# Patient Record
Sex: MALE | Race: BLACK OR AFRICAN AMERICAN | Employment: FULL TIME
[De-identification: names, ages, dates, MRNs, and addresses within clinical notes are randomized per-mention and may not be internally consistent; named-entity substitution may affect disease eponyms.]

---

## 2017-02-03 ENCOUNTER — TELEPHONE (OUTPATIENT)
Dept: ONCOLOGY | Facility: CLINIC | Age: 57
End: 2017-02-03

## 2017-04-17 ENCOUNTER — HOSPITAL ENCOUNTER (OUTPATIENT)
Age: 57
Discharge: HOME | End: 2017-04-17
Payer: COMMERCIAL

## 2017-04-17 LAB — PROSTATE SPECIFIC ANTIGEN: 1.59 UG/L

## 2017-04-17 PROCEDURE — 84153 ASSAY OF PSA TOTAL: CPT

## 2017-04-17 PROCEDURE — 36415 COLL VENOUS BLD VENIPUNCTURE: CPT

## 2017-04-19 ENCOUNTER — HOSPITAL ENCOUNTER (OUTPATIENT)
Dept: PHARMACY | Age: 57
Setting detail: THERAPIES SERIES
Discharge: HOME | End: 2017-04-19
Payer: COMMERCIAL

## 2017-04-19 DIAGNOSIS — I26.99 PULMONARY EMBOLISM, OTHER: ICD-10-CM

## 2017-04-19 DIAGNOSIS — I82.491 DEEP VEIN THROMBOSIS (DVT) OF OTHER VEIN OF RIGHT LOWER EXTREMITY: ICD-10-CM

## 2017-04-19 LAB
INR BLD: 2.4
PROTIME: 29.3 SECONDS

## 2017-04-19 PROCEDURE — 85610 PROTHROMBIN TIME: CPT

## 2017-04-19 PROCEDURE — G0463 HOSPITAL OUTPT CLINIC VISIT: HCPCS

## 2017-05-31 ENCOUNTER — TELEPHONE (OUTPATIENT)
Dept: PHARMACY | Age: 57
End: 2017-05-31

## 2017-05-31 DIAGNOSIS — I82.491 DEEP VEIN THROMBOSIS (DVT) OF OTHER VEIN OF RIGHT LOWER EXTREMITY: ICD-10-CM

## 2017-05-31 DIAGNOSIS — I26.99 PULMONARY EMBOLISM, OTHER: ICD-10-CM

## 2017-06-05 ENCOUNTER — HOSPITAL ENCOUNTER (OUTPATIENT)
Dept: PHARMACY | Age: 57
Setting detail: THERAPIES SERIES
Discharge: HOME | End: 2017-06-05
Payer: COMMERCIAL

## 2017-06-05 DIAGNOSIS — I82.491 DEEP VEIN THROMBOSIS (DVT) OF OTHER VEIN OF RIGHT LOWER EXTREMITY: ICD-10-CM

## 2017-06-05 DIAGNOSIS — I26.99 PULMONARY EMBOLISM, OTHER: ICD-10-CM

## 2017-06-05 LAB
INR BLD: 3.1
PROTIME: 37 SECONDS

## 2017-06-05 PROCEDURE — 85610 PROTHROMBIN TIME: CPT

## 2017-06-05 PROCEDURE — 99211 OFF/OP EST MAY X REQ PHY/QHP: CPT

## 2017-07-17 ENCOUNTER — APPOINTMENT (OUTPATIENT)
Dept: PHARMACY | Age: 57
End: 2017-07-17
Payer: COMMERCIAL

## 2017-07-17 ENCOUNTER — HOSPITAL ENCOUNTER (OUTPATIENT)
Dept: PHARMACY | Age: 57
Setting detail: THERAPIES SERIES
Discharge: HOME | End: 2017-07-17
Payer: COMMERCIAL

## 2017-07-17 LAB
INR BLD: 3.5
PROTIME: 41.6 SECONDS

## 2017-07-17 PROCEDURE — 85610 PROTHROMBIN TIME: CPT

## 2017-07-17 PROCEDURE — 99211 OFF/OP EST MAY X REQ PHY/QHP: CPT

## 2017-07-31 ENCOUNTER — HOSPITAL ENCOUNTER (OUTPATIENT)
Dept: PHARMACY | Age: 57
Setting detail: THERAPIES SERIES
Discharge: HOME | End: 2017-07-31
Payer: COMMERCIAL

## 2017-07-31 LAB
INR BLD: 2.9
PROTIME: 34.2 SECONDS

## 2017-07-31 PROCEDURE — 85610 PROTHROMBIN TIME: CPT

## 2017-07-31 PROCEDURE — 99211 OFF/OP EST MAY X REQ PHY/QHP: CPT

## 2017-09-11 ENCOUNTER — TELEPHONE (OUTPATIENT)
Dept: PHARMACY | Age: 57
End: 2017-09-11

## 2017-09-11 ENCOUNTER — APPOINTMENT (OUTPATIENT)
Dept: PHARMACY | Age: 57
End: 2017-09-11
Payer: COMMERCIAL

## 2017-09-26 ENCOUNTER — HOSPITAL ENCOUNTER (OUTPATIENT)
Dept: PHARMACY | Age: 57
Setting detail: THERAPIES SERIES
Discharge: HOME | End: 2017-09-26
Payer: COMMERCIAL

## 2017-09-26 LAB
INR BLD: 2.9
PROTIME: 34.8 SECONDS

## 2017-09-26 PROCEDURE — 85610 PROTHROMBIN TIME: CPT

## 2017-09-26 PROCEDURE — 99211 OFF/OP EST MAY X REQ PHY/QHP: CPT

## 2017-11-07 ENCOUNTER — APPOINTMENT (OUTPATIENT)
Dept: PHARMACY | Age: 57
End: 2017-11-07
Payer: COMMERCIAL

## 2017-11-07 ENCOUNTER — TELEPHONE (OUTPATIENT)
Dept: PHARMACY | Age: 57
End: 2017-11-07

## 2017-11-07 DIAGNOSIS — I26.99 OTHER PULMONARY EMBOLISM WITHOUT ACUTE COR PULMONALE, UNSPECIFIED CHRONICITY (HCC): ICD-10-CM

## 2017-11-07 DIAGNOSIS — I82.401 DEEP VEIN THROMBOSIS (DVT) OF RIGHT LOWER EXTREMITY, UNSPECIFIED CHRONICITY, UNSPECIFIED VEIN (HCC): ICD-10-CM

## 2017-11-22 ENCOUNTER — HOSPITAL ENCOUNTER (OUTPATIENT)
Dept: PHARMACY | Age: 57
Setting detail: THERAPIES SERIES
Discharge: HOME | End: 2017-11-22
Payer: COMMERCIAL

## 2017-11-22 DIAGNOSIS — I26.99 OTHER PULMONARY EMBOLISM WITHOUT ACUTE COR PULMONALE, UNSPECIFIED CHRONICITY (HCC): ICD-10-CM

## 2017-11-22 DIAGNOSIS — I82.401 DEEP VEIN THROMBOSIS (DVT) OF RIGHT LOWER EXTREMITY, UNSPECIFIED CHRONICITY, UNSPECIFIED VEIN (HCC): ICD-10-CM

## 2017-11-22 LAB
INR BLD: 2.8
PROTIME: 33.6 SECONDS

## 2017-11-22 PROCEDURE — 85610 PROTHROMBIN TIME: CPT

## 2017-11-22 PROCEDURE — 99211 OFF/OP EST MAY X REQ PHY/QHP: CPT

## 2017-12-20 ENCOUNTER — TELEPHONE (OUTPATIENT)
Dept: PHARMACY | Age: 57
End: 2017-12-20

## 2017-12-20 ENCOUNTER — APPOINTMENT (OUTPATIENT)
Dept: PHARMACY | Age: 57
End: 2017-12-20
Payer: COMMERCIAL

## 2017-12-20 DIAGNOSIS — I82.401 DEEP VEIN THROMBOSIS (DVT) OF RIGHT LOWER EXTREMITY, UNSPECIFIED CHRONICITY, UNSPECIFIED VEIN (HCC): ICD-10-CM

## 2017-12-20 DIAGNOSIS — I26.99 OTHER PULMONARY EMBOLISM WITHOUT ACUTE COR PULMONALE, UNSPECIFIED CHRONICITY (HCC): ICD-10-CM

## 2017-12-27 ENCOUNTER — HOSPITAL ENCOUNTER (OUTPATIENT)
Dept: PHARMACY | Age: 57
Setting detail: THERAPIES SERIES
Discharge: HOME | End: 2017-12-27
Payer: COMMERCIAL

## 2017-12-27 DIAGNOSIS — I82.401 DEEP VEIN THROMBOSIS (DVT) OF RIGHT LOWER EXTREMITY, UNSPECIFIED CHRONICITY, UNSPECIFIED VEIN (HCC): ICD-10-CM

## 2017-12-27 DIAGNOSIS — I26.99 OTHER PULMONARY EMBOLISM WITHOUT ACUTE COR PULMONALE, UNSPECIFIED CHRONICITY (HCC): ICD-10-CM

## 2017-12-27 LAB
INR BLD: 2.3
PROTIME: 27.9 SECONDS

## 2017-12-27 PROCEDURE — 85610 PROTHROMBIN TIME: CPT

## 2017-12-27 PROCEDURE — 99211 OFF/OP EST MAY X REQ PHY/QHP: CPT

## 2017-12-27 NOTE — PROGRESS NOTES
Patient states compliant all of the time with regimen. No bleeding or thromboembolic side effects noted. No significant med or dietary changes. No significant recent illness or disease state changes. PT/INR done in office per protocol. INR is 2.3 which is therapeutic. Warfarin regimen will be continued at current dose of 20mg on Mon and 15mg all other days. Will retest in 5 weeks. Patient understands dosing directions and information discussed. Dosing schedule and follow up appointment given to patient. Progress note routed to referring physicians office. Patient acknowledges working in consult agreement with pharmacist as referred by his/her physician.

## 2018-01-19 ENCOUNTER — HOSPITAL ENCOUNTER (OUTPATIENT)
Dept: PHARMACY | Age: 58
Setting detail: THERAPIES SERIES
Discharge: HOME | End: 2018-01-19
Payer: COMMERCIAL

## 2018-01-19 DIAGNOSIS — I26.99 OTHER PULMONARY EMBOLISM WITHOUT ACUTE COR PULMONALE, UNSPECIFIED CHRONICITY (HCC): ICD-10-CM

## 2018-01-19 DIAGNOSIS — I82.401 DEEP VEIN THROMBOSIS (DVT) OF RIGHT LOWER EXTREMITY, UNSPECIFIED CHRONICITY, UNSPECIFIED VEIN (HCC): ICD-10-CM

## 2018-01-19 LAB
INR BLD: 1.3
PROTIME: 16.2 SECONDS

## 2018-01-19 PROCEDURE — 99211 OFF/OP EST MAY X REQ PHY/QHP: CPT

## 2018-01-19 PROCEDURE — 85610 PROTHROMBIN TIME: CPT

## 2018-01-19 NOTE — PROGRESS NOTES
Patient states compliant all of the time with regimen. No bleeding or thromboembolic side effects noted. No significant dietary changes. Patient reports starting Lovenox on 1/17 and will be on it for 10 days, since he will be having a colonoscopy on 1/22, made sure patient knew to hold Lovenox as well the day of the procedure. No significant recent illness or disease state changes. PT/INR done in office per protocol. INR is 1.3 which is subtherapeutic, due to holding dose for procedure. Warfarin regimen was held starting on 1/17 until 1/22 for procedure. Will restart following procedure 20 mg for next Tues, Wed and Thurs along with Lovenox injections. Will retest in 1 week. Patient understands dosing directions and information discussed. Dosing schedule and follow up appointment given to patient. Progress note routed to referring physicians office. Patient acknowledges working in consult agreement with pharmacist as referred by his/her physician.

## 2018-01-26 ENCOUNTER — HOSPITAL ENCOUNTER (OUTPATIENT)
Dept: PHARMACY | Age: 58
Setting detail: THERAPIES SERIES
Discharge: HOME | End: 2018-01-26
Payer: COMMERCIAL

## 2018-01-26 DIAGNOSIS — I26.99 OTHER PULMONARY EMBOLISM WITHOUT ACUTE COR PULMONALE, UNSPECIFIED CHRONICITY (HCC): ICD-10-CM

## 2018-01-26 DIAGNOSIS — I82.401 DEEP VEIN THROMBOSIS (DVT) OF RIGHT LOWER EXTREMITY, UNSPECIFIED CHRONICITY, UNSPECIFIED VEIN (HCC): ICD-10-CM

## 2018-01-26 LAB
INR BLD: 1.5
PROTIME: 17.6 SECONDS

## 2018-01-26 PROCEDURE — 85610 PROTHROMBIN TIME: CPT

## 2018-01-26 PROCEDURE — 99211 OFF/OP EST MAY X REQ PHY/QHP: CPT

## 2018-01-29 ENCOUNTER — HOSPITAL ENCOUNTER (OUTPATIENT)
Dept: PHARMACY | Age: 58
Setting detail: THERAPIES SERIES
Discharge: HOME | End: 2018-01-29
Payer: COMMERCIAL

## 2018-01-29 DIAGNOSIS — I82.401 DEEP VEIN THROMBOSIS (DVT) OF RIGHT LOWER EXTREMITY, UNSPECIFIED CHRONICITY, UNSPECIFIED VEIN (HCC): ICD-10-CM

## 2018-01-29 DIAGNOSIS — I26.99 OTHER PULMONARY EMBOLISM WITHOUT ACUTE COR PULMONALE, UNSPECIFIED CHRONICITY (HCC): ICD-10-CM

## 2018-01-29 LAB
INR BLD: 2.2
PROTIME: 26.7 SECONDS

## 2018-01-29 PROCEDURE — 99211 OFF/OP EST MAY X REQ PHY/QHP: CPT

## 2018-01-29 PROCEDURE — 85610 PROTHROMBIN TIME: CPT

## 2018-01-31 ENCOUNTER — APPOINTMENT (OUTPATIENT)
Dept: PHARMACY | Age: 58
End: 2018-01-31
Payer: COMMERCIAL

## 2018-02-26 ENCOUNTER — TELEPHONE (OUTPATIENT)
Dept: PHARMACY | Age: 58
End: 2018-02-26

## 2018-02-26 DIAGNOSIS — I82.401 DEEP VEIN THROMBOSIS (DVT) OF RIGHT LOWER EXTREMITY, UNSPECIFIED CHRONICITY, UNSPECIFIED VEIN (HCC): ICD-10-CM

## 2018-02-26 DIAGNOSIS — I26.99 OTHER PULMONARY EMBOLISM WITHOUT ACUTE COR PULMONALE, UNSPECIFIED CHRONICITY (HCC): ICD-10-CM

## 2018-02-26 NOTE — TELEPHONE ENCOUNTER
Patient LVM to cancel appt this morning as he was moved to a different school. He will call back to reschedule.

## 2018-03-20 ENCOUNTER — HOSPITAL ENCOUNTER (OUTPATIENT)
Dept: PHARMACY | Age: 58
Setting detail: THERAPIES SERIES
Discharge: HOME | End: 2018-03-20
Payer: COMMERCIAL

## 2018-03-20 DIAGNOSIS — I26.99 OTHER PULMONARY EMBOLISM WITHOUT ACUTE COR PULMONALE, UNSPECIFIED CHRONICITY (HCC): ICD-10-CM

## 2018-03-20 DIAGNOSIS — I82.401 DEEP VEIN THROMBOSIS (DVT) OF RIGHT LOWER EXTREMITY, UNSPECIFIED CHRONICITY, UNSPECIFIED VEIN (HCC): ICD-10-CM

## 2018-03-20 LAB
INR BLD: 3.6
PROTIME: 43.08 SECONDS

## 2018-03-20 PROCEDURE — 99211 OFF/OP EST MAY X REQ PHY/QHP: CPT

## 2018-03-20 PROCEDURE — 85610 PROTHROMBIN TIME: CPT

## 2018-04-03 ENCOUNTER — HOSPITAL ENCOUNTER (OUTPATIENT)
Dept: PHARMACY | Age: 58
Setting detail: THERAPIES SERIES
Discharge: HOME | End: 2018-04-03
Payer: COMMERCIAL

## 2018-04-03 DIAGNOSIS — I82.401 DEEP VEIN THROMBOSIS (DVT) OF RIGHT LOWER EXTREMITY, UNSPECIFIED CHRONICITY, UNSPECIFIED VEIN (HCC): ICD-10-CM

## 2018-04-03 DIAGNOSIS — I26.99 OTHER PULMONARY EMBOLISM WITHOUT ACUTE COR PULMONALE, UNSPECIFIED CHRONICITY (HCC): ICD-10-CM

## 2018-04-03 LAB
INR BLD: 3
PROTIME: 35.6 SECONDS

## 2018-04-03 PROCEDURE — 85610 PROTHROMBIN TIME: CPT

## 2018-04-03 PROCEDURE — 99211 OFF/OP EST MAY X REQ PHY/QHP: CPT

## 2018-05-01 ENCOUNTER — HOSPITAL ENCOUNTER (OUTPATIENT)
Dept: PHARMACY | Age: 58
Setting detail: THERAPIES SERIES
Discharge: HOME | End: 2018-05-01
Payer: COMMERCIAL

## 2018-05-01 DIAGNOSIS — I82.401 DEEP VEIN THROMBOSIS (DVT) OF RIGHT LOWER EXTREMITY, UNSPECIFIED CHRONICITY, UNSPECIFIED VEIN (HCC): ICD-10-CM

## 2018-05-01 DIAGNOSIS — I26.99 OTHER PULMONARY EMBOLISM WITHOUT ACUTE COR PULMONALE, UNSPECIFIED CHRONICITY (HCC): ICD-10-CM

## 2018-05-01 LAB
INR BLD: 2.7
PROTIME: 31.9 SECONDS

## 2018-05-01 PROCEDURE — 99211 OFF/OP EST MAY X REQ PHY/QHP: CPT

## 2018-05-01 PROCEDURE — 85610 PROTHROMBIN TIME: CPT

## 2018-05-29 ENCOUNTER — HOSPITAL ENCOUNTER (OUTPATIENT)
Dept: PHARMACY | Age: 58
Setting detail: THERAPIES SERIES
Discharge: HOME | End: 2018-05-29
Payer: COMMERCIAL

## 2018-05-29 DIAGNOSIS — I82.401 DEEP VEIN THROMBOSIS (DVT) OF RIGHT LOWER EXTREMITY, UNSPECIFIED CHRONICITY, UNSPECIFIED VEIN (HCC): ICD-10-CM

## 2018-05-29 DIAGNOSIS — I26.99 OTHER PULMONARY EMBOLISM WITHOUT ACUTE COR PULMONALE, UNSPECIFIED CHRONICITY (HCC): ICD-10-CM

## 2018-05-29 LAB
INR BLD: 2.6
PROTIME: 31.3 SECONDS

## 2018-05-29 PROCEDURE — 85610 PROTHROMBIN TIME: CPT

## 2018-05-29 PROCEDURE — 99211 OFF/OP EST MAY X REQ PHY/QHP: CPT

## 2018-06-26 ENCOUNTER — APPOINTMENT (OUTPATIENT)
Dept: PHARMACY | Age: 58
End: 2018-06-26
Payer: COMMERCIAL

## 2018-06-28 ENCOUNTER — HOSPITAL ENCOUNTER (OUTPATIENT)
Age: 58
Discharge: HOME | End: 2018-06-28
Payer: COMMERCIAL

## 2018-06-28 LAB
ANION GAP SERPL CALCULATED.3IONS-SCNC: 10 MMOL/L (ref 9–17)
BILIRUBIN URINE: NEGATIVE
BUN BLDV-MCNC: 15 MG/DL (ref 6–20)
BUN/CREAT BLD: 18 (ref 9–20)
CALCIUM SERPL-MCNC: 9.2 MG/DL (ref 8.6–10.4)
CHLORIDE BLD-SCNC: 100 MMOL/L (ref 98–107)
CHOLESTEROL, FASTING: 182 MG/DL
CHOLESTEROL/HDL RATIO: 3.9
CO2: 27 MMOL/L (ref 20–31)
COLOR: YELLOW
COMMENT UA: ABNORMAL
CREAT SERPL-MCNC: 0.84 MG/DL (ref 0.7–1.2)
CREATININE URINE: 84.1 MG/DL (ref 39–259)
GFR AFRICAN AMERICAN: >60 ML/MIN
GFR NON-AFRICAN AMERICAN: >60 ML/MIN
GFR SERPL CREATININE-BSD FRML MDRD: NORMAL ML/MIN/{1.73_M2}
GFR SERPL CREATININE-BSD FRML MDRD: NORMAL ML/MIN/{1.73_M2}
GLUCOSE BLD-MCNC: 89 MG/DL (ref 70–99)
GLUCOSE URINE: NEGATIVE
HDLC SERPL-MCNC: 47 MG/DL
KETONES, URINE: NEGATIVE
LDL CHOLESTEROL: 104 MG/DL (ref 0–130)
LEUKOCYTE ESTERASE, URINE: NEGATIVE
NITRITE, URINE: NEGATIVE
PH UA: 7 (ref 5–8)
POTASSIUM SERPL-SCNC: 4.3 MMOL/L (ref 3.7–5.3)
PROSTATE SPECIFIC ANTIGEN: 1.91 UG/L
PROTEIN UA: NEGATIVE
SODIUM BLD-SCNC: 137 MMOL/L (ref 135–144)
SPECIFIC GRAVITY UA: 1 (ref 1–1.03)
TOTAL PROTEIN, URINE: 10 MG/DL
TRIGLYCERIDE, FASTING: 153 MG/DL
TURBIDITY: CLEAR
URINE HGB: NEGATIVE
UROBILINOGEN, URINE: NORMAL
VLDLC SERPL CALC-MCNC: ABNORMAL MG/DL (ref 1–30)

## 2018-06-28 PROCEDURE — 36415 COLL VENOUS BLD VENIPUNCTURE: CPT

## 2018-06-28 PROCEDURE — 80048 BASIC METABOLIC PNL TOTAL CA: CPT

## 2018-06-28 PROCEDURE — 84156 ASSAY OF PROTEIN URINE: CPT

## 2018-06-28 PROCEDURE — 82570 ASSAY OF URINE CREATININE: CPT

## 2018-06-28 PROCEDURE — 84153 ASSAY OF PSA TOTAL: CPT

## 2018-06-28 PROCEDURE — 80061 LIPID PANEL: CPT

## 2018-06-28 PROCEDURE — 81003 URINALYSIS AUTO W/O SCOPE: CPT

## 2018-07-03 ENCOUNTER — HOSPITAL ENCOUNTER (OUTPATIENT)
Dept: PHARMACY | Age: 58
Setting detail: THERAPIES SERIES
Discharge: HOME | End: 2018-07-03
Payer: COMMERCIAL

## 2018-07-03 DIAGNOSIS — I82.401 DEEP VEIN THROMBOSIS (DVT) OF RIGHT LOWER EXTREMITY, UNSPECIFIED CHRONICITY, UNSPECIFIED VEIN (HCC): ICD-10-CM

## 2018-07-03 DIAGNOSIS — I26.99 OTHER PULMONARY EMBOLISM WITHOUT ACUTE COR PULMONALE, UNSPECIFIED CHRONICITY (HCC): ICD-10-CM

## 2018-07-03 LAB
INR BLD: 2.6
PROTIME: 31.4 SECONDS

## 2018-07-03 PROCEDURE — 85610 PROTHROMBIN TIME: CPT

## 2018-07-03 PROCEDURE — 99211 OFF/OP EST MAY X REQ PHY/QHP: CPT

## 2018-08-14 ENCOUNTER — HOSPITAL ENCOUNTER (OUTPATIENT)
Dept: PHARMACY | Age: 58
Setting detail: THERAPIES SERIES
Discharge: HOME | End: 2018-08-14
Payer: COMMERCIAL

## 2018-08-14 DIAGNOSIS — I26.99 OTHER PULMONARY EMBOLISM WITHOUT ACUTE COR PULMONALE, UNSPECIFIED CHRONICITY (HCC): ICD-10-CM

## 2018-08-14 DIAGNOSIS — I82.401 DEEP VEIN THROMBOSIS (DVT) OF RIGHT LOWER EXTREMITY, UNSPECIFIED CHRONICITY, UNSPECIFIED VEIN (HCC): ICD-10-CM

## 2018-08-14 LAB
INR BLD: 2.4
INR BLD: 3.5
PROTIME: 28.8 SECONDS
PROTIME: 41.8 SECONDS

## 2018-08-14 PROCEDURE — 99212 OFFICE O/P EST SF 10 MIN: CPT

## 2018-08-14 PROCEDURE — 85610 PROTHROMBIN TIME: CPT

## 2018-08-28 ENCOUNTER — HOSPITAL ENCOUNTER (OUTPATIENT)
Dept: PHARMACY | Age: 58
Setting detail: THERAPIES SERIES
Discharge: HOME | End: 2018-08-28
Payer: COMMERCIAL

## 2018-08-28 DIAGNOSIS — I82.401 DEEP VEIN THROMBOSIS (DVT) OF RIGHT LOWER EXTREMITY, UNSPECIFIED CHRONICITY, UNSPECIFIED VEIN (HCC): ICD-10-CM

## 2018-08-28 DIAGNOSIS — I26.99 OTHER PULMONARY EMBOLISM WITHOUT ACUTE COR PULMONALE, UNSPECIFIED CHRONICITY (HCC): ICD-10-CM

## 2018-08-28 LAB
INR BLD: 3.2
PROTIME: 37.8 SECONDS

## 2018-08-28 PROCEDURE — 99212 OFFICE O/P EST SF 10 MIN: CPT

## 2018-08-28 PROCEDURE — 85610 PROTHROMBIN TIME: CPT

## 2018-08-28 NOTE — PROGRESS NOTES
Patient states compliant all of the time with regimen. No bleeding or thromboembolic side effects noted. No significant med or dietary changes. No significant recent illness or disease state changes. PT/INR done in office per protocol. INR is 3.2 which is just above goal.     Warfarin regimen will be decreased to 15 mg daily. Will retest in 2 weeks. Patient understands dosing directions and information discussed. Dosing schedule and follow up appointment given to patient. Progress note routed to referring physicians office. Patient acknowledges working in consult agreement with pharmacist as referred by his/her physician.

## 2018-09-11 ENCOUNTER — APPOINTMENT (OUTPATIENT)
Dept: PHARMACY | Age: 58
End: 2018-09-11
Payer: COMMERCIAL

## 2018-09-12 ENCOUNTER — HOSPITAL ENCOUNTER (OUTPATIENT)
Dept: PHARMACY | Age: 58
Setting detail: THERAPIES SERIES
Discharge: HOME | End: 2018-09-12
Payer: COMMERCIAL

## 2018-09-12 DIAGNOSIS — I82.401 DEEP VEIN THROMBOSIS (DVT) OF RIGHT LOWER EXTREMITY, UNSPECIFIED CHRONICITY, UNSPECIFIED VEIN (HCC): ICD-10-CM

## 2018-09-12 DIAGNOSIS — I26.99 OTHER PULMONARY EMBOLISM WITHOUT ACUTE COR PULMONALE, UNSPECIFIED CHRONICITY (HCC): ICD-10-CM

## 2018-09-12 LAB
INR BLD: 2.2
PROTIME: 26.6 SECONDS

## 2018-09-12 PROCEDURE — 99211 OFF/OP EST MAY X REQ PHY/QHP: CPT

## 2018-09-12 PROCEDURE — 85610 PROTHROMBIN TIME: CPT

## 2018-09-12 NOTE — PROGRESS NOTES
Patient states he was taking 20mg Mon and 15mg all other days instead of instructed regimen of 15mg QD. He states he did not realize his dose was decreased. .  No bleeding or thromboembolic side effects noted. No significant med or dietary changes. No significant recent illness or disease state changes. PT/INR done in office per protocol. INR is 2.2 which is therapeutic. Warfarin regimen will be continued at current dose of 20mg mon and 15mg all other days. Will retest in 4 weeks. Patient understands dosing directions and information discussed. Dosing schedule and follow up appointment given to patient. Progress note routed to referring physicians office. Patient acknowledges working in consult agreement with pharmacist as referred by his/her physician.

## 2018-10-10 ENCOUNTER — HOSPITAL ENCOUNTER (OUTPATIENT)
Dept: PHARMACY | Age: 58
Setting detail: THERAPIES SERIES
Discharge: HOME | End: 2018-10-10
Payer: COMMERCIAL

## 2018-10-10 DIAGNOSIS — I82.401 DEEP VEIN THROMBOSIS (DVT) OF RIGHT LOWER EXTREMITY, UNSPECIFIED CHRONICITY, UNSPECIFIED VEIN (HCC): ICD-10-CM

## 2018-10-10 DIAGNOSIS — I26.99 OTHER PULMONARY EMBOLISM WITHOUT ACUTE COR PULMONALE, UNSPECIFIED CHRONICITY (HCC): ICD-10-CM

## 2018-10-10 LAB
INR BLD: 2
PROTIME: 24.5 SECONDS

## 2018-10-10 PROCEDURE — 99211 OFF/OP EST MAY X REQ PHY/QHP: CPT

## 2018-10-10 PROCEDURE — 85610 PROTHROMBIN TIME: CPT

## 2018-10-10 NOTE — PROGRESS NOTES
Patient states compliant all of the time with regimen. No bleeding or thromboembolic side effects noted. No significant med or dietary changes. No significant recent illness or disease state changes. PT/INR done in office per protocol. INR is 2 which is therapeutic. Warfarin regimen will be continued at current dose of 20mg Mon, 15mg all other days. Will retest in 4 weeks. Patient understands dosing directions and information discussed. Dosing schedule and follow up appointment given to patient. Progress note routed to referring physicians office. Patient acknowledges working in consult agreement with pharmacist as referred by his/her physician.

## 2018-11-08 ENCOUNTER — HOSPITAL ENCOUNTER (OUTPATIENT)
Dept: PHARMACY | Age: 58
Setting detail: THERAPIES SERIES
Discharge: HOME | End: 2018-11-08
Payer: COMMERCIAL

## 2018-11-08 DIAGNOSIS — I82.401 DEEP VEIN THROMBOSIS (DVT) OF RIGHT LOWER EXTREMITY, UNSPECIFIED CHRONICITY, UNSPECIFIED VEIN (HCC): ICD-10-CM

## 2018-11-08 DIAGNOSIS — I26.99 OTHER PULMONARY EMBOLISM WITHOUT ACUTE COR PULMONALE, UNSPECIFIED CHRONICITY (HCC): ICD-10-CM

## 2018-11-08 LAB
INR BLD: 2.1
PROTIME: 24.8 SECONDS

## 2018-11-08 PROCEDURE — 99211 OFF/OP EST MAY X REQ PHY/QHP: CPT

## 2018-11-08 PROCEDURE — 85610 PROTHROMBIN TIME: CPT

## 2018-12-06 ENCOUNTER — HOSPITAL ENCOUNTER (OUTPATIENT)
Dept: PHARMACY | Age: 58
Setting detail: THERAPIES SERIES
Discharge: HOME | End: 2018-12-06
Payer: COMMERCIAL

## 2018-12-06 DIAGNOSIS — I82.401 DEEP VEIN THROMBOSIS (DVT) OF RIGHT LOWER EXTREMITY, UNSPECIFIED CHRONICITY, UNSPECIFIED VEIN (HCC): ICD-10-CM

## 2018-12-06 DIAGNOSIS — I26.99 OTHER PULMONARY EMBOLISM WITHOUT ACUTE COR PULMONALE, UNSPECIFIED CHRONICITY (HCC): ICD-10-CM

## 2018-12-06 LAB
INR BLD: 2.5
PROTIME: 29.6 SECONDS

## 2018-12-06 PROCEDURE — 85610 PROTHROMBIN TIME: CPT

## 2018-12-06 PROCEDURE — 99211 OFF/OP EST MAY X REQ PHY/QHP: CPT

## 2019-01-03 ENCOUNTER — TELEPHONE (OUTPATIENT)
Dept: PHARMACY | Age: 59
End: 2019-01-03

## 2019-01-03 ENCOUNTER — HOSPITAL ENCOUNTER (OUTPATIENT)
Dept: PHARMACY | Age: 59
Setting detail: THERAPIES SERIES
End: 2019-01-03
Payer: COMMERCIAL

## 2019-01-03 DIAGNOSIS — I82.401 DEEP VEIN THROMBOSIS (DVT) OF RIGHT LOWER EXTREMITY, UNSPECIFIED CHRONICITY, UNSPECIFIED VEIN (HCC): ICD-10-CM

## 2019-01-03 DIAGNOSIS — I26.99 OTHER PULMONARY EMBOLISM WITHOUT ACUTE COR PULMONALE, UNSPECIFIED CHRONICITY (HCC): ICD-10-CM

## 2019-01-15 ENCOUNTER — HOSPITAL ENCOUNTER (OUTPATIENT)
Dept: PHARMACY | Age: 59
Setting detail: THERAPIES SERIES
Discharge: HOME | End: 2019-01-15
Payer: COMMERCIAL

## 2019-01-15 DIAGNOSIS — I82.401 DEEP VEIN THROMBOSIS (DVT) OF RIGHT LOWER EXTREMITY, UNSPECIFIED CHRONICITY, UNSPECIFIED VEIN (HCC): ICD-10-CM

## 2019-01-15 DIAGNOSIS — I26.99 OTHER PULMONARY EMBOLISM WITHOUT ACUTE COR PULMONALE, UNSPECIFIED CHRONICITY (HCC): ICD-10-CM

## 2019-01-15 LAB
INR BLD: 3
PROTIME: 35.6 SECONDS

## 2019-01-15 PROCEDURE — 85610 PROTHROMBIN TIME: CPT

## 2019-01-15 PROCEDURE — 99211 OFF/OP EST MAY X REQ PHY/QHP: CPT

## 2019-02-12 ENCOUNTER — HOSPITAL ENCOUNTER (OUTPATIENT)
Dept: PHARMACY | Age: 59
Setting detail: THERAPIES SERIES
Discharge: HOME | End: 2019-02-12
Payer: COMMERCIAL

## 2019-02-12 DIAGNOSIS — I26.99 OTHER PULMONARY EMBOLISM WITHOUT ACUTE COR PULMONALE, UNSPECIFIED CHRONICITY (HCC): ICD-10-CM

## 2019-02-12 DIAGNOSIS — I82.401 DEEP VEIN THROMBOSIS (DVT) OF RIGHT LOWER EXTREMITY, UNSPECIFIED CHRONICITY, UNSPECIFIED VEIN (HCC): ICD-10-CM

## 2019-02-12 LAB
INR BLD: 3.3
PROTIME: 39.9 SECONDS

## 2019-02-12 PROCEDURE — 99211 OFF/OP EST MAY X REQ PHY/QHP: CPT

## 2019-02-12 PROCEDURE — 85610 PROTHROMBIN TIME: CPT

## 2019-02-26 ENCOUNTER — TELEPHONE (OUTPATIENT)
Dept: PHARMACY | Age: 59
End: 2019-02-26

## 2019-03-07 ENCOUNTER — TELEPHONE (OUTPATIENT)
Dept: PHARMACY | Age: 59
End: 2019-03-07

## 2019-03-07 DIAGNOSIS — I82.401 DEEP VEIN THROMBOSIS (DVT) OF RIGHT LOWER EXTREMITY, UNSPECIFIED CHRONICITY, UNSPECIFIED VEIN (HCC): ICD-10-CM

## 2019-03-07 DIAGNOSIS — I26.99 OTHER PULMONARY EMBOLISM WITHOUT ACUTE COR PULMONALE, UNSPECIFIED CHRONICITY (HCC): ICD-10-CM

## 2019-03-14 ENCOUNTER — HOSPITAL ENCOUNTER (OUTPATIENT)
Dept: PHARMACY | Age: 59
Setting detail: THERAPIES SERIES
Discharge: HOME | End: 2019-03-14
Payer: COMMERCIAL

## 2019-03-14 DIAGNOSIS — I26.99 OTHER PULMONARY EMBOLISM WITHOUT ACUTE COR PULMONALE, UNSPECIFIED CHRONICITY (HCC): ICD-10-CM

## 2019-03-14 DIAGNOSIS — I82.401 DEEP VEIN THROMBOSIS (DVT) OF RIGHT LOWER EXTREMITY, UNSPECIFIED CHRONICITY, UNSPECIFIED VEIN (HCC): ICD-10-CM

## 2019-03-14 LAB
INR BLD: 1.6
PROTIME: 18.7 SECONDS

## 2019-03-14 PROCEDURE — 99212 OFFICE O/P EST SF 10 MIN: CPT

## 2019-03-14 PROCEDURE — 85610 PROTHROMBIN TIME: CPT

## 2019-03-28 ENCOUNTER — HOSPITAL ENCOUNTER (OUTPATIENT)
Dept: PHARMACY | Age: 59
Setting detail: THERAPIES SERIES
Discharge: HOME | End: 2019-03-28
Payer: COMMERCIAL

## 2019-03-28 DIAGNOSIS — I26.99 OTHER PULMONARY EMBOLISM WITHOUT ACUTE COR PULMONALE, UNSPECIFIED CHRONICITY (HCC): ICD-10-CM

## 2019-03-28 DIAGNOSIS — I82.401 DEEP VEIN THROMBOSIS (DVT) OF RIGHT LOWER EXTREMITY, UNSPECIFIED CHRONICITY, UNSPECIFIED VEIN (HCC): ICD-10-CM

## 2019-03-28 LAB
INR BLD: 1.5
PROTIME: 18.5 SECONDS

## 2019-03-28 PROCEDURE — 85610 PROTHROMBIN TIME: CPT

## 2019-03-28 PROCEDURE — 99212 OFFICE O/P EST SF 10 MIN: CPT

## 2019-04-05 ENCOUNTER — HOSPITAL ENCOUNTER (OUTPATIENT)
Dept: PHARMACY | Age: 59
Setting detail: THERAPIES SERIES
Discharge: HOME | End: 2019-04-05
Payer: COMMERCIAL

## 2019-04-05 DIAGNOSIS — I82.401 DEEP VEIN THROMBOSIS (DVT) OF RIGHT LOWER EXTREMITY, UNSPECIFIED CHRONICITY, UNSPECIFIED VEIN (HCC): ICD-10-CM

## 2019-04-05 DIAGNOSIS — I26.99 OTHER PULMONARY EMBOLISM WITHOUT ACUTE COR PULMONALE, UNSPECIFIED CHRONICITY (HCC): ICD-10-CM

## 2019-04-05 LAB
INR BLD: 2
PROTIME: 24.5 SECONDS

## 2019-04-05 PROCEDURE — 85610 PROTHROMBIN TIME: CPT

## 2019-04-05 PROCEDURE — 99211 OFF/OP EST MAY X REQ PHY/QHP: CPT

## 2019-04-23 ENCOUNTER — HOSPITAL ENCOUNTER (OUTPATIENT)
Dept: PHARMACY | Age: 59
Setting detail: THERAPIES SERIES
Discharge: HOME | End: 2019-04-23
Payer: COMMERCIAL

## 2019-04-23 DIAGNOSIS — I82.401 DEEP VEIN THROMBOSIS (DVT) OF RIGHT LOWER EXTREMITY, UNSPECIFIED CHRONICITY, UNSPECIFIED VEIN (HCC): ICD-10-CM

## 2019-04-23 DIAGNOSIS — I26.99 OTHER PULMONARY EMBOLISM WITHOUT ACUTE COR PULMONALE, UNSPECIFIED CHRONICITY (HCC): ICD-10-CM

## 2019-04-23 LAB
INR BLD: 3.2
PROTIME: 38.4 SECONDS

## 2019-04-23 PROCEDURE — 99211 OFF/OP EST MAY X REQ PHY/QHP: CPT

## 2019-04-23 PROCEDURE — 85610 PROTHROMBIN TIME: CPT

## 2019-05-02 NOTE — PROGRESS NOTES
Patient states compliant all of the time with regimen. No bleeding or thromboembolic side effects noted. No significant med or dietary changes. No significant recent illness or disease state changes. PT/INR done in office per protocol. INR is 2.8 which is therapeutic. Warfarin regimen will be continued at current dose. Will retest in 4 weeks. Patient understands dosing directions and information discussed. Dosing schedule and follow up appointment given to patient. Progress note routed to referring physicians office. Physical forms placed on Geni's desk.

## 2019-05-21 ENCOUNTER — HOSPITAL ENCOUNTER (OUTPATIENT)
Dept: PHARMACY | Age: 59
Setting detail: THERAPIES SERIES
Discharge: HOME | End: 2019-05-21
Payer: COMMERCIAL

## 2019-05-21 DIAGNOSIS — I26.99 OTHER PULMONARY EMBOLISM WITHOUT ACUTE COR PULMONALE, UNSPECIFIED CHRONICITY (HCC): ICD-10-CM

## 2019-05-21 DIAGNOSIS — I82.401 DEEP VEIN THROMBOSIS (DVT) OF RIGHT LOWER EXTREMITY, UNSPECIFIED CHRONICITY, UNSPECIFIED VEIN (HCC): ICD-10-CM

## 2019-05-21 LAB
INR BLD: 3.1
PROTIME: 37.3 SECONDS

## 2019-05-21 PROCEDURE — 99211 OFF/OP EST MAY X REQ PHY/QHP: CPT

## 2019-05-21 PROCEDURE — 85610 PROTHROMBIN TIME: CPT

## 2019-05-21 NOTE — PROGRESS NOTES
Patient states compliant all of the time with regimen. No bleeding or thromboembolic side effects noted. No significant med or dietary changes. No significant recent illness or disease state changes. PT/INR done in office per protocol. INR is 3.1 which is just above goal.     Warfarin regimen will be continued at current dose of 20mg tues/sat and 15mg all other days. Will retest in 4 weeks. Patient understands dosing directions and information discussed. Dosing schedule and follow up appointment given to patient. Progress note routed to referring physicians office. Patient acknowledges working in consult agreement with pharmacist as referred by his/her physician.       Luisa Thakkar, 5/21/2019 8:10 AM

## 2019-06-18 ENCOUNTER — HOSPITAL ENCOUNTER (OUTPATIENT)
Dept: PHARMACY | Age: 59
Setting detail: THERAPIES SERIES
Discharge: HOME | End: 2019-06-18
Payer: COMMERCIAL

## 2019-06-18 DIAGNOSIS — I26.99 OTHER PULMONARY EMBOLISM WITHOUT ACUTE COR PULMONALE, UNSPECIFIED CHRONICITY (HCC): ICD-10-CM

## 2019-06-18 DIAGNOSIS — I82.401 DEEP VEIN THROMBOSIS (DVT) OF RIGHT LOWER EXTREMITY, UNSPECIFIED CHRONICITY, UNSPECIFIED VEIN (HCC): ICD-10-CM

## 2019-06-18 LAB
INR BLD: 3.3
PROTIME: 39.3 SECONDS

## 2019-06-18 PROCEDURE — 85610 PROTHROMBIN TIME: CPT

## 2019-06-18 PROCEDURE — 99212 OFFICE O/P EST SF 10 MIN: CPT

## 2019-07-02 ENCOUNTER — HOSPITAL ENCOUNTER (OUTPATIENT)
Dept: PHARMACY | Age: 59
Setting detail: THERAPIES SERIES
Discharge: HOME | End: 2019-07-02
Payer: COMMERCIAL

## 2019-07-02 DIAGNOSIS — I26.99 OTHER PULMONARY EMBOLISM WITHOUT ACUTE COR PULMONALE, UNSPECIFIED CHRONICITY (HCC): ICD-10-CM

## 2019-07-02 DIAGNOSIS — I82.401 DEEP VEIN THROMBOSIS (DVT) OF RIGHT LOWER EXTREMITY, UNSPECIFIED CHRONICITY, UNSPECIFIED VEIN (HCC): ICD-10-CM

## 2019-07-02 LAB
INR BLD: 2.6
PROTIME: 31.4 SECONDS

## 2019-07-02 PROCEDURE — 85610 PROTHROMBIN TIME: CPT

## 2019-07-02 PROCEDURE — 99211 OFF/OP EST MAY X REQ PHY/QHP: CPT

## 2019-07-02 NOTE — PROGRESS NOTES
Patient states compliant all of the time with regimen. No bleeding or thromboembolic side effects noted. No significant med or dietary changes. No significant recent illness or disease state changes. PT/INR done in office per protocol. INR is 2.6 which is therapeutic. Warfarin regimen will be continued at current dose 20mg every Tue; 15mg  AOD. Will retest in 4 weeks. Patient understands dosing directions and information discussed. Dosing schedule and follow up appointment given to patient. Progress note routed to referring physicians office. Patient acknowledges working in consult agreement with pharmacist as referred by his/her physician.       [address], 7/2/2019 4:12 PM

## 2019-07-30 ENCOUNTER — HOSPITAL ENCOUNTER (OUTPATIENT)
Dept: PHARMACY | Age: 59
Setting detail: THERAPIES SERIES
Discharge: HOME | End: 2019-07-30
Payer: COMMERCIAL

## 2019-07-30 DIAGNOSIS — I82.401 DEEP VEIN THROMBOSIS (DVT) OF RIGHT LOWER EXTREMITY, UNSPECIFIED CHRONICITY, UNSPECIFIED VEIN (HCC): ICD-10-CM

## 2019-07-30 DIAGNOSIS — I26.99 OTHER PULMONARY EMBOLISM WITHOUT ACUTE COR PULMONALE, UNSPECIFIED CHRONICITY (HCC): ICD-10-CM

## 2019-07-30 LAB
INR BLD: 3
PROTIME: 35.7 SECONDS

## 2019-07-30 PROCEDURE — 99211 OFF/OP EST MAY X REQ PHY/QHP: CPT

## 2019-07-30 PROCEDURE — 85610 PROTHROMBIN TIME: CPT

## 2019-08-27 ENCOUNTER — HOSPITAL ENCOUNTER (OUTPATIENT)
Dept: PHARMACY | Age: 59
Setting detail: THERAPIES SERIES
Discharge: HOME | End: 2019-08-27
Payer: COMMERCIAL

## 2019-08-27 DIAGNOSIS — I82.401 DEEP VEIN THROMBOSIS (DVT) OF RIGHT LOWER EXTREMITY, UNSPECIFIED CHRONICITY, UNSPECIFIED VEIN (HCC): ICD-10-CM

## 2019-08-27 DIAGNOSIS — I26.99 OTHER PULMONARY EMBOLISM WITHOUT ACUTE COR PULMONALE, UNSPECIFIED CHRONICITY (HCC): ICD-10-CM

## 2019-08-27 LAB
INR BLD: 2.9
PROTIME: 34.5 SECONDS

## 2019-08-27 PROCEDURE — 85610 PROTHROMBIN TIME: CPT

## 2019-08-27 PROCEDURE — 99211 OFF/OP EST MAY X REQ PHY/QHP: CPT

## 2019-09-24 ENCOUNTER — APPOINTMENT (OUTPATIENT)
Dept: PHARMACY | Age: 59
End: 2019-09-24
Payer: COMMERCIAL

## 2019-09-25 ENCOUNTER — HOSPITAL ENCOUNTER (OUTPATIENT)
Dept: PHARMACY | Age: 59
Setting detail: THERAPIES SERIES
Discharge: HOME | End: 2019-09-25
Payer: COMMERCIAL

## 2019-09-25 DIAGNOSIS — I26.99 OTHER PULMONARY EMBOLISM WITHOUT ACUTE COR PULMONALE, UNSPECIFIED CHRONICITY (HCC): ICD-10-CM

## 2019-09-25 DIAGNOSIS — I82.401 DEEP VEIN THROMBOSIS (DVT) OF RIGHT LOWER EXTREMITY, UNSPECIFIED CHRONICITY, UNSPECIFIED VEIN (HCC): ICD-10-CM

## 2019-09-25 LAB
INR BLD: 3.4
PROTIME: 40.5 SECONDS

## 2019-09-25 PROCEDURE — 99212 OFFICE O/P EST SF 10 MIN: CPT

## 2019-09-25 PROCEDURE — 85610 PROTHROMBIN TIME: CPT

## 2019-10-09 ENCOUNTER — HOSPITAL ENCOUNTER (OUTPATIENT)
Dept: PHARMACY | Age: 59
Setting detail: THERAPIES SERIES
Discharge: HOME | End: 2019-10-09
Payer: COMMERCIAL

## 2019-10-09 DIAGNOSIS — I26.99 OTHER PULMONARY EMBOLISM WITHOUT ACUTE COR PULMONALE, UNSPECIFIED CHRONICITY (HCC): ICD-10-CM

## 2019-10-09 DIAGNOSIS — I82.401 DEEP VEIN THROMBOSIS (DVT) OF RIGHT LOWER EXTREMITY, UNSPECIFIED CHRONICITY, UNSPECIFIED VEIN (HCC): ICD-10-CM

## 2019-10-09 LAB
INR BLD: 1.7
PROTIME: 20.7 SECONDS

## 2019-10-09 PROCEDURE — 85610 PROTHROMBIN TIME: CPT

## 2019-10-09 PROCEDURE — 99212 OFFICE O/P EST SF 10 MIN: CPT

## 2019-10-23 ENCOUNTER — APPOINTMENT (OUTPATIENT)
Dept: PHARMACY | Age: 59
End: 2019-10-23
Payer: COMMERCIAL

## 2019-10-24 ENCOUNTER — APPOINTMENT (OUTPATIENT)
Dept: PHARMACY | Age: 59
End: 2019-10-24
Payer: COMMERCIAL

## 2019-10-29 ENCOUNTER — HOSPITAL ENCOUNTER (OUTPATIENT)
Dept: PHARMACY | Age: 59
Setting detail: THERAPIES SERIES
Discharge: HOME | End: 2019-10-29
Payer: COMMERCIAL

## 2019-10-29 DIAGNOSIS — I26.99 OTHER PULMONARY EMBOLISM WITHOUT ACUTE COR PULMONALE, UNSPECIFIED CHRONICITY (HCC): ICD-10-CM

## 2019-10-29 DIAGNOSIS — I82.401 DEEP VEIN THROMBOSIS (DVT) OF RIGHT LOWER EXTREMITY, UNSPECIFIED CHRONICITY, UNSPECIFIED VEIN (HCC): ICD-10-CM

## 2019-10-29 LAB
INR BLD: 2.8
PROTIME: 33.4 SECONDS

## 2019-10-29 PROCEDURE — 99211 OFF/OP EST MAY X REQ PHY/QHP: CPT

## 2019-10-29 PROCEDURE — 85610 PROTHROMBIN TIME: CPT

## 2019-11-26 ENCOUNTER — TELEPHONE (OUTPATIENT)
Dept: PHARMACY | Age: 59
End: 2019-11-26

## 2019-11-26 DIAGNOSIS — I82.401 DEEP VEIN THROMBOSIS (DVT) OF RIGHT LOWER EXTREMITY, UNSPECIFIED CHRONICITY, UNSPECIFIED VEIN (HCC): ICD-10-CM

## 2019-11-26 DIAGNOSIS — I26.99 OTHER PULMONARY EMBOLISM WITHOUT ACUTE COR PULMONALE, UNSPECIFIED CHRONICITY (HCC): ICD-10-CM

## 2019-12-02 ENCOUNTER — TELEPHONE (OUTPATIENT)
Dept: PHARMACY | Age: 59
End: 2019-12-02

## 2019-12-02 DIAGNOSIS — I82.401 DEEP VEIN THROMBOSIS (DVT) OF RIGHT LOWER EXTREMITY, UNSPECIFIED CHRONICITY, UNSPECIFIED VEIN (HCC): ICD-10-CM

## 2019-12-02 DIAGNOSIS — I26.99 OTHER PULMONARY EMBOLISM WITHOUT ACUTE COR PULMONALE, UNSPECIFIED CHRONICITY (HCC): ICD-10-CM

## 2019-12-12 ENCOUNTER — APPOINTMENT (OUTPATIENT)
Dept: PHARMACY | Age: 59
End: 2019-12-12
Payer: COMMERCIAL

## 2019-12-12 ENCOUNTER — HOSPITAL ENCOUNTER (OUTPATIENT)
Dept: PHARMACY | Age: 59
Setting detail: THERAPIES SERIES
Discharge: HOME | End: 2019-12-12
Payer: COMMERCIAL

## 2019-12-12 DIAGNOSIS — I82.401 DEEP VEIN THROMBOSIS (DVT) OF RIGHT LOWER EXTREMITY, UNSPECIFIED CHRONICITY, UNSPECIFIED VEIN (HCC): ICD-10-CM

## 2019-12-12 DIAGNOSIS — I26.99 OTHER PULMONARY EMBOLISM WITHOUT ACUTE COR PULMONALE, UNSPECIFIED CHRONICITY (HCC): ICD-10-CM

## 2019-12-12 LAB
INR BLD: 2.4
PROTIME: 29 SECONDS

## 2019-12-12 PROCEDURE — 85610 PROTHROMBIN TIME: CPT

## 2019-12-12 PROCEDURE — 99211 OFF/OP EST MAY X REQ PHY/QHP: CPT

## 2020-01-09 ENCOUNTER — HOSPITAL ENCOUNTER (OUTPATIENT)
Dept: PHARMACY | Age: 60
Setting detail: THERAPIES SERIES
Discharge: HOME | End: 2020-01-09
Payer: COMMERCIAL

## 2020-01-09 LAB
INR BLD: 2.4
PROTIME: 29.4 SECONDS

## 2020-01-09 PROCEDURE — 85610 PROTHROMBIN TIME: CPT

## 2020-01-09 PROCEDURE — 99211 OFF/OP EST MAY X REQ PHY/QHP: CPT

## 2020-01-09 NOTE — PROGRESS NOTES
Patient states compliant all of the time with regimen. No bleeding or thromboembolic side effects noted. No significant med or dietary changes. No significant recent illness or disease state changes. PT/INR done in office per protocol. INR is 2.4 which is therapeutic. Warfarin regimen will be continued at current dose of 15mg daily. Will retest in 6 weeks. Patient understands dosing directions and information discussed. Dosing schedule and follow up appointment given to patient. Progress note routed to referring physicians office. Patient acknowledges working in consult agreement with pharmacist as referred by his/her physician.       Demond Partida, 1/9/2020 7:58 AM

## 2020-02-20 ENCOUNTER — HOSPITAL ENCOUNTER (OUTPATIENT)
Dept: PHARMACY | Age: 60
Setting detail: THERAPIES SERIES
Discharge: HOME | End: 2020-02-20
Payer: COMMERCIAL

## 2020-02-20 LAB
INR BLD: 3.3
PROTIME: 39.9 SECONDS

## 2020-02-20 PROCEDURE — 85610 PROTHROMBIN TIME: CPT

## 2020-02-20 PROCEDURE — 99211 OFF/OP EST MAY X REQ PHY/QHP: CPT

## 2020-02-20 NOTE — PROGRESS NOTES
Patient states compliant all of the time with regimen. No bleeding or thromboembolic side effects noted. No significant med changes. No significant recent illness or disease state changes. Patient states he has been eating much less vegetables lately due to eating more fast food over the last few weeks. PT/INR done in office per protocol. INR is 3.3 which is supratherapeutic. Warfarin regimen will be continued at current dose of 15mg daily as patient plans on eating more Vit K foods as he did previously. Will retest in 2 weeks. Patient understands dosing directions and information discussed. Dosing schedule and follow up appointment given to patient. Progress note routed to referring physicians office. Patient acknowledges working in consult agreement with pharmacist as referred by his/her physician.       Christie Allen, 2/20/2020 7:59 AM

## 2020-03-05 ENCOUNTER — HOSPITAL ENCOUNTER (OUTPATIENT)
Dept: PHARMACY | Age: 60
Setting detail: THERAPIES SERIES
Discharge: HOME | End: 2020-03-05
Payer: COMMERCIAL

## 2020-03-05 LAB
INR BLD: 1.7
PROTIME: 20.4 SECONDS

## 2020-03-05 PROCEDURE — 85610 PROTHROMBIN TIME: CPT

## 2020-03-05 PROCEDURE — 99212 OFFICE O/P EST SF 10 MIN: CPT

## 2020-03-13 ENCOUNTER — TELEPHONE (OUTPATIENT)
Dept: PHARMACY | Age: 60
End: 2020-03-13

## 2020-03-19 ENCOUNTER — HOSPITAL ENCOUNTER (OUTPATIENT)
Dept: PHARMACY | Age: 60
Setting detail: THERAPIES SERIES
Discharge: HOME | End: 2020-03-19
Payer: COMMERCIAL

## 2020-03-19 ENCOUNTER — APPOINTMENT (OUTPATIENT)
Dept: PHARMACY | Age: 60
End: 2020-03-19
Payer: COMMERCIAL

## 2020-03-19 LAB
INR BLD: 2
PROTIME: 23.8 SECONDS

## 2020-03-19 PROCEDURE — 85610 PROTHROMBIN TIME: CPT

## 2020-03-19 PROCEDURE — 99211 OFF/OP EST MAY X REQ PHY/QHP: CPT

## 2020-03-19 NOTE — PROGRESS NOTES
Patient states compliant all of the time with regimen. No bleeding or thromboembolic side effects noted. No significant med or dietary changes. No significant recent illness or disease state changes. PT/INR done in office per protocol. INR is 2.2 which is therapeutic. Warfarin regimen will be continued at current dose of 15mg daily. Will retest in 6 weeks. Standing order for PT/INR has been placed in preparation to transition to possible remote INR monitoring given efforts to reduce the spread of COVID-19. Patient understands dosing directions and information discussed. Dosing schedule and follow up appointment given to patient. Progress note routed to referring physicians office. Patient acknowledges working in consult agreement with pharmacist as referred by his/her physician.       Enrique Martinez, 3/19/2020 10:22 AM    CLINICAL PHARMACY CONSULT: MED RECONCILIATION/REVIEW ADDENDUM    For Pharmacy Admin Tracking Only    PHSO: No  Total # of Interventions Recommended: 0  - Maintenance Safety Lab Monitoring #: 2  Total Interventions Accepted: 0  Time Spent (min): [address] [facility] [address], [address] [facility]

## 2020-04-29 ENCOUNTER — TELEPHONE (OUTPATIENT)
Dept: PHARMACY | Age: 60
End: 2020-04-29

## 2020-04-29 NOTE — TELEPHONE ENCOUNTER
Reached out to patient to give reminder of scheduled INR appointment. Called and spoke with patient to confirm. Travel screening completed. Patient is aware to present to outpatient lab for INR test, and medication management pharmacist will follow up via phone with dosing instruction and follow up appointment.

## 2020-04-30 ENCOUNTER — HOSPITAL ENCOUNTER (OUTPATIENT)
Dept: PHARMACY | Age: 60
Setting detail: THERAPIES SERIES
Discharge: HOME | End: 2020-04-30
Payer: COMMERCIAL

## 2020-04-30 LAB
INR BLD: 2.4
PROTIME: 28.9 SECONDS

## 2020-04-30 PROCEDURE — 85610 PROTHROMBIN TIME: CPT

## 2020-04-30 PROCEDURE — 99211 OFF/OP EST MAY X REQ PHY/QHP: CPT

## 2020-06-01 ENCOUNTER — HOSPITAL ENCOUNTER (OUTPATIENT)
Age: 60
Discharge: HOME | End: 2020-06-01
Payer: COMMERCIAL

## 2020-06-01 LAB
ANION GAP SERPL CALCULATED.3IONS-SCNC: 10 MMOL/L (ref 9–17)
BILIRUBIN URINE: NEGATIVE
BUN BLDV-MCNC: 13 MG/DL (ref 6–20)
BUN/CREAT BLD: NORMAL (ref 9–20)
CALCIUM SERPL-MCNC: 9.2 MG/DL (ref 8.6–10.4)
CHLORIDE BLD-SCNC: 104 MMOL/L (ref 98–107)
CHOLESTEROL, FASTING: 174 MG/DL
CHOLESTEROL/HDL RATIO: 4.5
CO2: 26 MMOL/L (ref 20–31)
COLOR: YELLOW
COMMENT UA: ABNORMAL
CREAT SERPL-MCNC: 0.92 MG/DL (ref 0.7–1.2)
CREATININE URINE: 135.4 MG/DL (ref 39–259)
GFR AFRICAN AMERICAN: >60 ML/MIN
GFR NON-AFRICAN AMERICAN: >60 ML/MIN
GFR SERPL CREATININE-BSD FRML MDRD: NORMAL ML/MIN/{1.73_M2}
GFR SERPL CREATININE-BSD FRML MDRD: NORMAL ML/MIN/{1.73_M2}
GLUCOSE BLD-MCNC: 88 MG/DL (ref 70–99)
GLUCOSE URINE: NEGATIVE
HDLC SERPL-MCNC: 39 MG/DL
KETONES, URINE: NEGATIVE
LDL CHOLESTEROL: 109 MG/DL (ref 0–130)
LEUKOCYTE ESTERASE, URINE: NEGATIVE
NITRITE, URINE: NEGATIVE
PH UA: 8.5 (ref 5–8)
POTASSIUM SERPL-SCNC: 5.1 MMOL/L (ref 3.7–5.3)
PROTEIN UA: NEGATIVE
SODIUM BLD-SCNC: 140 MMOL/L (ref 135–144)
SPECIFIC GRAVITY UA: 1.02 (ref 1–1.03)
TOTAL PROTEIN, URINE: 11 MG/DL
TRIGLYCERIDE, FASTING: 131 MG/DL
TURBIDITY: CLEAR
URINE HGB: NEGATIVE
URINE TOTAL PROTEIN CREATININE RATIO: 0.08 (ref 0–0.2)
UROBILINOGEN, URINE: NORMAL
VLDLC SERPL CALC-MCNC: ABNORMAL MG/DL (ref 1–30)

## 2020-06-01 PROCEDURE — 84156 ASSAY OF PROTEIN URINE: CPT

## 2020-06-01 PROCEDURE — 80061 LIPID PANEL: CPT

## 2020-06-01 PROCEDURE — 36415 COLL VENOUS BLD VENIPUNCTURE: CPT

## 2020-06-01 PROCEDURE — 81003 URINALYSIS AUTO W/O SCOPE: CPT

## 2020-06-01 PROCEDURE — 80048 BASIC METABOLIC PNL TOTAL CA: CPT

## 2020-06-01 PROCEDURE — 82570 ASSAY OF URINE CREATININE: CPT

## 2020-06-01 PROCEDURE — 93005 ELECTROCARDIOGRAM TRACING: CPT | Performed by: FAMILY MEDICINE

## 2020-06-02 LAB
EKG ATRIAL RATE: 46 BPM
EKG P AXIS: 57 DEGREES
EKG P-R INTERVAL: 176 MS
EKG Q-T INTERVAL: 410 MS
EKG QRS DURATION: 96 MS
EKG QTC CALCULATION (BAZETT): 358 MS
EKG R AXIS: 41 DEGREES
EKG T AXIS: 39 DEGREES
EKG VENTRICULAR RATE: 46 BPM

## 2020-06-02 PROCEDURE — 93010 ELECTROCARDIOGRAM REPORT: CPT | Performed by: INTERNAL MEDICINE

## 2020-06-11 ENCOUNTER — HOSPITAL ENCOUNTER (OUTPATIENT)
Dept: PHARMACY | Age: 60
Setting detail: THERAPIES SERIES
Discharge: HOME | End: 2020-06-11
Payer: COMMERCIAL

## 2020-06-11 VITALS — TEMPERATURE: 97.7 F

## 2020-06-11 LAB
INR BLD: 2.4
PROTIME: 28.7 SECONDS

## 2020-06-11 PROCEDURE — 85610 PROTHROMBIN TIME: CPT

## 2020-06-11 PROCEDURE — 99211 OFF/OP EST MAY X REQ PHY/QHP: CPT

## 2020-07-23 ENCOUNTER — TELEPHONE (OUTPATIENT)
Dept: PHARMACY | Age: 60
End: 2020-07-23

## 2020-08-13 ENCOUNTER — HOSPITAL ENCOUNTER (OUTPATIENT)
Dept: PHARMACY | Age: 60
Setting detail: THERAPIES SERIES
Discharge: HOME | End: 2020-08-13
Payer: COMMERCIAL

## 2020-08-13 VITALS — TEMPERATURE: 97.5 F

## 2020-08-13 LAB
INR BLD: 3.1
PROTIME: 36.9 SECONDS

## 2020-08-13 PROCEDURE — 85610 PROTHROMBIN TIME: CPT

## 2020-08-13 PROCEDURE — 99211 OFF/OP EST MAY X REQ PHY/QHP: CPT

## 2020-08-13 RX ORDER — WARFARIN SODIUM 10 MG/1
15 TABLET ORAL DAILY
Qty: 135 TABLET | Refills: 1 | OUTPATIENT
Start: 2020-08-13

## 2020-08-13 NOTE — PROGRESS NOTES
Patient states compliant all of the time with regimen. No bleeding or thromboembolic side effects noted. No significant med or dietary changes. No significant recent illness or disease state changes. PT/INR done in office per protocol. INR is 3.1 which is just above goal.     Warfarin regimen will be continued at current dose 15mg daily. Refill of 10mg tabs issued to [facility] per his request.  Will retest in 6 weeks. Patient understands dosing directions and information discussed. Dosing schedule and follow up appointment given to patient. Progress note routed to referring physicians office. Discussed with patient the Pharmacist Collaborative Practice Agreement. Patient provided verbal and/or electronic (ex. [employer]) consent to participate in the collaborative practice agreement between the pharmacist and referred patient. This is in lieu of paper consent due to COVID-19 precautions and the use of remote/virtual visits.        CLINICAL PHARMACY CONSULT: MED RECONCILIATION/REVIEW ADDENDUM    For Pharmacy Admin Tracking Only    PHSO: No  Total # of Interventions Recommended: 1  - Refills Provided #: 1  - Maintenance Safety Lab Monitoring #: 1  Total Interventions Accepted: 1  Time Spent (min): Faheem Ruth PharmD

## 2020-09-24 ENCOUNTER — HOSPITAL ENCOUNTER (OUTPATIENT)
Dept: PHARMACY | Age: 60
Setting detail: THERAPIES SERIES
Discharge: HOME | End: 2020-09-24
Payer: COMMERCIAL

## 2020-09-24 LAB
INR BLD: 3
PROTIME: 35.7 SECONDS

## 2020-09-24 PROCEDURE — 99211 OFF/OP EST MAY X REQ PHY/QHP: CPT

## 2020-09-24 PROCEDURE — 85610 PROTHROMBIN TIME: CPT

## 2020-09-24 NOTE — PROGRESS NOTES
Patient states compliant all of the time with regimen. No bleeding or thromboembolic side effects noted. No significant med or dietary changes. No significant recent illness or disease state changes. PT/INR done in office per protocol. INR is 3.0 which is therapeutic. Warfarin regimen will be continued at current dose of 15mg QD. Will retest in 6 weeks. Patient understands dosing directions and information discussed. Dosing schedule and follow up appointment given to patient. Progress note routed to referring physicians office. Discussed with patient the Pharmacist Collaborative Practice Agreement. Patient provided verbal and/or electronic (ex. [employer]) consent to participate in the collaborative practice agreement between the pharmacist and referred patient. This is in lieu of paper consent due to COVID-19 precautions and the use of remote/virtual visits.        CLINICAL PHARMACY CONSULT: MED RECONCILIATION/REVIEW ADDENDUM    For Pharmacy Admin Tracking Only    PHSO: No  Total # of Interventions Recommended: 1  - Maintenance Safety Lab Monitoring #: 1  Total Interventions Accepted: 1  Time Spent (min): [address], [address] [place] [address]

## 2020-11-05 ENCOUNTER — HOSPITAL ENCOUNTER (OUTPATIENT)
Dept: PHARMACY | Age: 60
Setting detail: THERAPIES SERIES
Discharge: HOME | End: 2020-11-05
Payer: COMMERCIAL

## 2020-11-05 VITALS — TEMPERATURE: 96.8 F

## 2020-11-05 LAB
INR BLD: 2.8
PROTIME: 34.1 SECONDS

## 2020-11-05 PROCEDURE — 99211 OFF/OP EST MAY X REQ PHY/QHP: CPT

## 2020-11-05 PROCEDURE — 85610 PROTHROMBIN TIME: CPT

## 2020-11-05 NOTE — PROGRESS NOTES
Patient seen in clinic for warfarin management due to DVT and PE with an INR goal of 2.0-3.0. Estimated duration of therapy is unspecified    Patient states compliant all of the time with regimen. No bleeding or thromboembolic side effects noted. No significant med or dietary changes. No significant recent illness or disease state changes. PT/INR done in office per protocol. INR is 2.8 which is therapeutic. Warfarin regimen will be continued at current dose 15 mg daily. Will retest in 6 weeks. Patient understands dosing directions and information discussed. Dosing schedule and follow up appointment given to patient. Progress note routed to referring physicians office. Discussed with patient the Pharmacist Collaborative Practice Agreement. Patient provided verbal and/or electronic (ex. [employer]) consent to participate in the collaborative practice agreement between the pharmacist and referred patient. This is in lieu of paper consent due to COVID-19 precautions and the use of remote/virtual visits.        CLINICAL PHARMACY CONSULT: MED RECONCILIATION/REVIEW ADDENDUM    For Pharmacy Admin Tracking Only    PHSO: No  Total # of Interventions Recommended: 0  - Maintenance Safety Lab Monitoring #: 1  Total Interventions Accepted: 0  Time Spent (min): 15    Claudene Pointer, PharmD Candidate [id number]

## 2020-12-17 ENCOUNTER — HOSPITAL ENCOUNTER (OUTPATIENT)
Dept: PHARMACY | Age: 60
Setting detail: THERAPIES SERIES
Discharge: HOME | End: 2020-12-17
Payer: COMMERCIAL

## 2020-12-17 VITALS — TEMPERATURE: 96.2 F

## 2020-12-17 LAB
INR BLD: 3.1
PROTIME: 37.7 SECONDS

## 2020-12-17 PROCEDURE — 85610 PROTHROMBIN TIME: CPT

## 2020-12-17 PROCEDURE — 99211 OFF/OP EST MAY X REQ PHY/QHP: CPT

## 2020-12-17 NOTE — PROGRESS NOTES
Patient seen in clinic for warfarin management due to DVT and PE with an INR goal of 2.0-3.0. Estimated duration of therapy is indefinite. Patient states compliant all of the time with regimen. No bleeding or thromboembolic side effects noted. No significant med or dietary changes. No significant recent illness or disease state changes. PT/INR done in office per protocol. INR is 3.1 which is just above goal.     Warfarin regimen will be continued at current dose of 15mg daily. Will retest in 6 weeks. Patient understands dosing directions and information discussed. Dosing schedule and follow up appointment given to patient. Progress note routed to referring physicians office. Discussed with patient the Pharmacist Collaborative Practice Agreement. Patient provided verbal and/or electronic (ex. [employer]) consent to participate in the collaborative practice agreement between the pharmacist and referred patient. This is in lieu of paper consent due to COVID-19 precautions and the use of remote/virtual visits.        CLINICAL PHARMACY CONSULT: MED RECONCILIATION/REVIEW ADDENDUM    For Pharmacy Admin Tracking Only    PHSO: No  Total # of Interventions Recommended: 0  - Maintenance Safety Lab Monitoring #: 1  Total Interventions Accepted: 0  Time Spent (min): [address], PharmD

## 2021-01-28 ENCOUNTER — HOSPITAL ENCOUNTER (OUTPATIENT)
Dept: PHARMACY | Age: 61
Setting detail: THERAPIES SERIES
Discharge: HOME | End: 2021-01-28
Payer: COMMERCIAL

## 2021-01-28 DIAGNOSIS — I26.99 OTHER PULMONARY EMBOLISM WITHOUT ACUTE COR PULMONALE, UNSPECIFIED CHRONICITY (HCC): ICD-10-CM

## 2021-01-28 DIAGNOSIS — I82.401 DEEP VEIN THROMBOSIS (DVT) OF RIGHT LOWER EXTREMITY, UNSPECIFIED CHRONICITY, UNSPECIFIED VEIN (HCC): ICD-10-CM

## 2021-01-28 LAB
INR BLD: 3.1
PROTIME: 37.1 SECONDS

## 2021-01-28 PROCEDURE — 99211 OFF/OP EST MAY X REQ PHY/QHP: CPT

## 2021-01-28 PROCEDURE — 85610 PROTHROMBIN TIME: CPT

## 2021-01-28 NOTE — PROGRESS NOTES
Patient seen in clinic for warfarin management due to DVT and PE with an INR goal of 2.0-3.0. Estimated duration of therapy is indefinite. Patient states compliant all of the time with regimen. No bleeding or thromboembolic side effects noted. No significant med or dietary changes. No significant recent illness or disease state changes. PT/INR done in office per protocol. INR is 3.1 which is just above goal.     Warfarin regimen will be continued at current dose of 15mg daily. Patient states he will eat a bit more veggies to keep INR midrange. Will retest in 6 weeks. Patient understands dosing directions and information discussed. Dosing schedule and follow up appointment given to patient. Progress note routed to referring physicians office. Discussed with patient the Pharmacist Collaborative Practice Agreement. Patient provided verbal and/or electronic (ex. [employer]) consent to participate in the collaborative practice agreement between the pharmacist and referred patient. This is in lieu of paper consent due to COVID-19 precautions and the use of remote/virtual visits.        CLINICAL PHARMACY CONSULT: MED RECONCILIATION/REVIEW ADDENDUM    For Pharmacy Admin Tracking Only    PHSO: No  Total # of Interventions Recommended: 0  - Maintenance Safety Lab Monitoring #: 1  Total Interventions Accepted: 0  Time Spent (min): [address], [address] [place] [address]

## 2021-03-11 ENCOUNTER — HOSPITAL ENCOUNTER (OUTPATIENT)
Dept: PHARMACY | Age: 61
Setting detail: THERAPIES SERIES
Discharge: HOME | End: 2021-03-11
Payer: COMMERCIAL

## 2021-03-11 DIAGNOSIS — I26.99 OTHER PULMONARY EMBOLISM WITHOUT ACUTE COR PULMONALE, UNSPECIFIED CHRONICITY (HCC): ICD-10-CM

## 2021-03-11 DIAGNOSIS — I82.401 DEEP VEIN THROMBOSIS (DVT) OF RIGHT LOWER EXTREMITY, UNSPECIFIED CHRONICITY, UNSPECIFIED VEIN (HCC): ICD-10-CM

## 2021-03-11 LAB
INR BLD: 2.7
PROTIME: 32.3 SECONDS

## 2021-03-11 PROCEDURE — 99211 OFF/OP EST MAY X REQ PHY/QHP: CPT

## 2021-03-11 PROCEDURE — 85610 PROTHROMBIN TIME: CPT

## 2021-03-11 NOTE — PROGRESS NOTES
Patient seen in clinic for warfarin management due to DVT and PE with an INR goal of 2.0-3.0. Estimated duration of therapy is indefinite. Patient states compliant all of the time with regimen. No bleeding or thromboembolic side effects noted. No significant med or dietary changes. No significant recent illness or disease state changes. PT/INR done in office per protocol. INR is 2.7 which is therapeutic. Warfarin regimen will be continued at current dose 15mg daily. Will retest in 6 weeks. Patient understands dosing directions and information discussed. Dosing schedule and follow up appointment given to patient. Progress note routed to referring physicians office. Discussed with patient the Pharmacist Collaborative Practice Agreement. Patient provided verbal and/or electronic (ex. [employer]) consent to participate in the collaborative practice agreement between the pharmacist and referred patient. This is in lieu of paper consent due to COVID-19 precautions and the use of remote/virtual visits.        CLINICAL PHARMACY CONSULT: MED RECONCILIATION/REVIEW ADDENDUM    For Pharmacy Admin Tracking Only    PHSO: No  Total # of Interventions Recommended: 0  - Maintenance Safety Lab Monitoring #: 1  Total Interventions Accepted: 0  Time Spent (min): Faheem Ruth PharmD

## 2021-04-22 ENCOUNTER — HOSPITAL ENCOUNTER (OUTPATIENT)
Dept: PHARMACY | Age: 61
Setting detail: THERAPIES SERIES
Discharge: HOME | End: 2021-04-22
Payer: COMMERCIAL

## 2021-04-22 DIAGNOSIS — I26.99 OTHER PULMONARY EMBOLISM WITHOUT ACUTE COR PULMONALE, UNSPECIFIED CHRONICITY (HCC): ICD-10-CM

## 2021-04-22 DIAGNOSIS — I82.401 DEEP VEIN THROMBOSIS (DVT) OF RIGHT LOWER EXTREMITY, UNSPECIFIED CHRONICITY, UNSPECIFIED VEIN (HCC): ICD-10-CM

## 2021-04-22 LAB
INR BLD: 2.6
PROTIME: 31.7 SECONDS

## 2021-04-22 PROCEDURE — 99211 OFF/OP EST MAY X REQ PHY/QHP: CPT

## 2021-04-22 PROCEDURE — 85610 PROTHROMBIN TIME: CPT

## 2021-04-22 NOTE — PROGRESS NOTES
Patient seen in clinic for warfarin management due to DVT and PE with an INR goal of 2.0-3.0. Estimated duration of therapy is indefinite. Patient states compliant all of the time with regimen. No bleeding or thromboembolic side effects noted. No significant med or dietary changes. No significant recent illness or disease state changes. PT/INR done in office per protocol. INR is 2.6 which is therapeutic. Warfarin regimen will be continued at current dose 15 mg every day. Will retest in 6 weeks. Patient understands dosing directions and information discussed. Dosing schedule and follow up appointment given to patient. Progress note routed to referring physicians office. Discussed with patient the Pharmacist Collaborative Practice Agreement. Patient provided verbal and/or electronic (ex. [employer]) consent to participate in the collaborative practice agreement between the pharmacist and referred patient. This is in lieu of paper consent due to COVID-19 precautions and the use of remote/virtual visits.

## 2021-06-03 ENCOUNTER — HOSPITAL ENCOUNTER (OUTPATIENT)
Dept: PHARMACY | Age: 61
Setting detail: THERAPIES SERIES
Discharge: HOME | End: 2021-06-03
Payer: COMMERCIAL

## 2021-06-03 DIAGNOSIS — I26.99 PULMONARY EMBOLISM, OTHER, UNSPECIFIED CHRONICITY, UNSPECIFIED WHETHER ACUTE COR PULMONALE PRESENT (HCC): Primary | ICD-10-CM

## 2021-06-03 DIAGNOSIS — I82.491 DEEP VEIN THROMBOSIS (DVT) OF OTHER VEIN OF RIGHT LOWER EXTREMITY, UNSPECIFIED CHRONICITY (HCC): ICD-10-CM

## 2021-06-03 LAB
INR BLD: 2.4
PROTIME: 28.8 SECONDS

## 2021-06-03 PROCEDURE — 99211 OFF/OP EST MAY X REQ PHY/QHP: CPT

## 2021-06-03 PROCEDURE — 85610 PROTHROMBIN TIME: CPT

## 2021-06-03 NOTE — PROGRESS NOTES
Patient seen in clinic for warfarin management due to DVT and PE with an INR goal of 2.0-3.0. Estimated duration of therapy is indefinite. Patient states he missed his dose this past . No bleeding or thromboembolic side effects noted. No significant med or dietary changes. No significant recent illness or disease state changes. PT/INR done in office per protocol. INR is 2.4 which is therapeutic. Warfarin regimen will be continued at current dose of 15mg daily. Will retest in 6 weeks. Patient understands dosing directions and information discussed. Dosing schedule and follow up appointment given to patient. Progress note routed to referring physicians office. Discussed with patient the Pharmacist Collaborative Practice Agreement. Patient provided verbal and/or electronic (ex. [employer]) consent to participate in the collaborative practice agreement between the pharmacist and referred patient. This is in lieu of paper consent due to COVID-19 precautions and the use of remote/virtual visits.        For Pharmacy Admin Tracking Only     Intervention Detail: Adherence Monitorin   Total # of Interventions Recommended: 1   Total # of Interventions Accepted: 1   Time Spent (min): 15

## 2021-07-15 ENCOUNTER — APPOINTMENT (OUTPATIENT)
Dept: PHARMACY | Age: 61
End: 2021-07-15
Payer: COMMERCIAL

## 2021-07-19 ENCOUNTER — HOSPITAL ENCOUNTER (OUTPATIENT)
Dept: PHARMACY | Age: 61
Setting detail: THERAPIES SERIES
Discharge: HOME | End: 2021-07-19
Payer: COMMERCIAL

## 2021-07-19 DIAGNOSIS — I26.99 PULMONARY EMBOLISM, OTHER, UNSPECIFIED CHRONICITY, UNSPECIFIED WHETHER ACUTE COR PULMONALE PRESENT (HCC): Primary | ICD-10-CM

## 2021-07-19 DIAGNOSIS — I82.401 DEEP VEIN THROMBOSIS (DVT) OF RIGHT LOWER EXTREMITY, UNSPECIFIED CHRONICITY, UNSPECIFIED VEIN (HCC): ICD-10-CM

## 2021-07-19 LAB
INR BLD: 3.2
PROTIME: 38.3 SECONDS

## 2021-07-19 PROCEDURE — 99211 OFF/OP EST MAY X REQ PHY/QHP: CPT

## 2021-07-19 PROCEDURE — 85610 PROTHROMBIN TIME: CPT

## 2021-07-23 ENCOUNTER — HOSPITAL ENCOUNTER (OUTPATIENT)
Age: 61
Setting detail: SPECIMEN
Discharge: HOME | End: 2021-07-23
Payer: COMMERCIAL

## 2021-07-23 LAB
-: NORMAL
AMORPHOUS: NORMAL
ANION GAP SERPL CALCULATED.3IONS-SCNC: 8 MMOL/L (ref 9–17)
BACTERIA: NORMAL
BILIRUBIN URINE: NEGATIVE
BUN BLDV-MCNC: 14 MG/DL (ref 6–20)
BUN/CREAT BLD: ABNORMAL (ref 9–20)
CALCIUM SERPL-MCNC: 8.9 MG/DL (ref 8.6–10.4)
CASTS UA: NORMAL /LPF (ref 0–8)
CHLORIDE BLD-SCNC: 106 MMOL/L (ref 98–107)
CHOLESTEROL, FASTING: 194 MG/DL
CHOLESTEROL/HDL RATIO: 5
CO2: 27 MMOL/L (ref 20–31)
COLOR: ABNORMAL
COMMENT UA: ABNORMAL
CREAT SERPL-MCNC: 0.93 MG/DL (ref 0.7–1.2)
CREATININE URINE: 333.7 MG/DL (ref 39–259)
CRYSTALS, UA: NORMAL /HPF
EPITHELIAL CELLS UA: NORMAL /HPF (ref 0–5)
GFR AFRICAN AMERICAN: >60 ML/MIN
GFR NON-AFRICAN AMERICAN: >60 ML/MIN
GFR SERPL CREATININE-BSD FRML MDRD: ABNORMAL ML/MIN/{1.73_M2}
GFR SERPL CREATININE-BSD FRML MDRD: ABNORMAL ML/MIN/{1.73_M2}
GLUCOSE BLD-MCNC: 85 MG/DL (ref 70–99)
GLUCOSE URINE: NEGATIVE
HDLC SERPL-MCNC: 39 MG/DL
KETONES, URINE: NEGATIVE
LDL CHOLESTEROL: 117 MG/DL (ref 0–130)
LEUKOCYTE ESTERASE, URINE: NEGATIVE
MUCUS: NORMAL
NITRITE, URINE: NEGATIVE
OTHER OBSERVATIONS UA: NORMAL
PH UA: 6 (ref 5–8)
POTASSIUM SERPL-SCNC: 4.3 MMOL/L (ref 3.7–5.3)
PROTEIN UA: ABNORMAL
RBC UA: NORMAL /HPF (ref 0–4)
RENAL EPITHELIAL, UA: NORMAL /HPF
SODIUM BLD-SCNC: 141 MMOL/L (ref 135–144)
SPECIFIC GRAVITY UA: 1.03 (ref 1–1.03)
TOTAL PROTEIN, URINE: 29 MG/DL
TRICHOMONAS: NORMAL
TRIGLYCERIDE, FASTING: 191 MG/DL
TURBIDITY: CLEAR
URINE HGB: NEGATIVE
UROBILINOGEN, URINE: NORMAL
VLDLC SERPL CALC-MCNC: ABNORMAL MG/DL (ref 1–30)
WBC UA: NORMAL /HPF (ref 0–5)
YEAST: NORMAL

## 2021-07-23 PROCEDURE — 84156 ASSAY OF PROTEIN URINE: CPT

## 2021-07-23 PROCEDURE — 36415 COLL VENOUS BLD VENIPUNCTURE: CPT

## 2021-07-23 PROCEDURE — 80061 LIPID PANEL: CPT

## 2021-07-23 PROCEDURE — 80048 BASIC METABOLIC PNL TOTAL CA: CPT

## 2021-07-23 PROCEDURE — 82570 ASSAY OF URINE CREATININE: CPT

## 2021-07-23 PROCEDURE — 81001 URINALYSIS AUTO W/SCOPE: CPT

## 2021-08-23 ENCOUNTER — HOSPITAL ENCOUNTER (OUTPATIENT)
Dept: PHARMACY | Age: 61
Setting detail: THERAPIES SERIES
Discharge: HOME | End: 2021-08-23
Payer: COMMERCIAL

## 2021-08-23 DIAGNOSIS — I82.491 DEEP VEIN THROMBOSIS (DVT) OF OTHER VEIN OF RIGHT LOWER EXTREMITY, UNSPECIFIED CHRONICITY (HCC): ICD-10-CM

## 2021-08-23 DIAGNOSIS — I26.99 PULMONARY EMBOLISM, OTHER, UNSPECIFIED CHRONICITY, UNSPECIFIED WHETHER ACUTE COR PULMONALE PRESENT (HCC): Primary | ICD-10-CM

## 2021-08-23 LAB
INR BLD: 2.2
PROTIME: 26.5 SECONDS

## 2021-08-23 PROCEDURE — 85610 PROTHROMBIN TIME: CPT

## 2021-08-23 PROCEDURE — 99211 OFF/OP EST MAY X REQ PHY/QHP: CPT

## 2021-08-23 NOTE — PROGRESS NOTES
Patient seen in clinic for warfarin management due to DVT and PE with an INR goal of 2.0-3.0. Estimated duration of therapy is indefinite. Patient states compliant all of the time with regimen. No bleeding or thromboembolic side effects noted. No significant med or dietary changes. No significant recent illness or disease state changes. PT/INR done in office per protocol. INR is 2.2 which is therapeutic. Warfarin regimen will be continued at current dose of 15mg daily. Will retest in 5 weeks. Patient understands dosing directions and information discussed. Dosing schedule and follow up appointment given to patient. Progress note routed to referring physicians office. Discussed with patient the Pharmacist Collaborative Practice Agreement. Patient provided verbal and/or electronic (ex. [employer]) consent to participate in the collaborative practice agreement between the pharmacist and referred patient. This is in lieu of paper consent due to COVID-19 precautions and the use of remote/virtual visits.        For Pharmacy Admin Tracking Only     Intervention Detail:    Total # of Interventions Recommended: 0   Total # of Interventions Accepted: 0   Time Spent (min): 15

## 2021-09-27 ENCOUNTER — APPOINTMENT (OUTPATIENT)
Dept: PHARMACY | Age: 61
End: 2021-09-27
Payer: COMMERCIAL

## 2021-09-29 ENCOUNTER — HOSPITAL ENCOUNTER (OUTPATIENT)
Dept: PHARMACY | Age: 61
Setting detail: THERAPIES SERIES
Discharge: HOME | End: 2021-09-29
Payer: COMMERCIAL

## 2021-09-29 DIAGNOSIS — I26.99 PULMONARY EMBOLISM, UNSPECIFIED CHRONICITY, UNSPECIFIED PULMONARY EMBOLISM TYPE, UNSPECIFIED WHETHER ACUTE COR PULMONALE PRESENT (HCC): Primary | ICD-10-CM

## 2021-09-29 DIAGNOSIS — I82.4Y1 DEEP VEIN THROMBOSIS (DVT) OF PROXIMAL VEIN OF RIGHT LOWER EXTREMITY, UNSPECIFIED CHRONICITY (HCC): ICD-10-CM

## 2021-09-29 LAB
INR BLD: 3.2
PROTIME: 38.3 SECONDS

## 2021-09-29 PROCEDURE — 85610 PROTHROMBIN TIME: CPT

## 2021-09-29 PROCEDURE — 99211 OFF/OP EST MAY X REQ PHY/QHP: CPT

## 2021-09-29 NOTE — PROGRESS NOTES
Patient seen in clinic for warfarin management due to DVT and PE with an INR goal of 2.0-3.0. Estimated duration of therapy is indefinite. Patient states compliant all of the time with regimen. No bleeding or thromboembolic side effects noted. No significant med changes. No significant recent illness or disease state changes. Patient reports he is still eating a lot of vegetables as he had been but has cut down significantly with brussel sprouts. PT/INR done in office per protocol. INR is 3.2 which is just above goal.     Warfarin regimen will be continued at current dose of 15mg daily. I encouraged him to have 1 extra serving of brussel sprouts per week to help INR decrease slightly. Will retest in 4 weeks. Patient understands dosing directions and information discussed. Dosing schedule and follow up appointment given to patient. Progress note routed to referring physicians office. Discussed with patient the Pharmacist Collaborative Practice Agreement. Patient provided verbal and/or electronic (ex. [employer]) consent to participate in the collaborative practice agreement between the pharmacist and referred patient. This is in lieu of paper consent due to COVID-19 precautions and the use of remote/virtual visits.        For Pharmacy Admin Tracking Only     Intervention Detail:    Total # of Interventions Recommended: 0   Total # of Interventions Accepted: 0   Time Spent (min): 15

## 2021-10-27 ENCOUNTER — HOSPITAL ENCOUNTER (OUTPATIENT)
Dept: PHARMACY | Age: 61
Setting detail: THERAPIES SERIES
Discharge: HOME | End: 2021-10-27
Payer: COMMERCIAL

## 2021-10-27 DIAGNOSIS — I82.4Y1 DEEP VEIN THROMBOSIS (DVT) OF PROXIMAL VEIN OF RIGHT LOWER EXTREMITY, UNSPECIFIED CHRONICITY (HCC): ICD-10-CM

## 2021-10-27 DIAGNOSIS — I26.99 PULMONARY EMBOLISM, UNSPECIFIED CHRONICITY, UNSPECIFIED PULMONARY EMBOLISM TYPE, UNSPECIFIED WHETHER ACUTE COR PULMONALE PRESENT (HCC): Primary | ICD-10-CM

## 2021-10-27 LAB
INR BLD: 1.7
PROTIME: 20.7 SECONDS

## 2021-10-27 PROCEDURE — 85610 PROTHROMBIN TIME: CPT

## 2021-10-27 PROCEDURE — 99212 OFFICE O/P EST SF 10 MIN: CPT

## 2021-11-24 ENCOUNTER — HOSPITAL ENCOUNTER (OUTPATIENT)
Dept: PHARMACY | Age: 61
Setting detail: THERAPIES SERIES
Discharge: HOME | End: 2021-11-24
Payer: COMMERCIAL

## 2021-11-24 DIAGNOSIS — I82.491 DEEP VEIN THROMBOSIS (DVT) OF OTHER VEIN OF RIGHT LOWER EXTREMITY, UNSPECIFIED CHRONICITY (HCC): ICD-10-CM

## 2021-11-24 DIAGNOSIS — I26.99 PULMONARY EMBOLISM, OTHER, UNSPECIFIED CHRONICITY, UNSPECIFIED WHETHER ACUTE COR PULMONALE PRESENT (HCC): Primary | ICD-10-CM

## 2021-11-24 LAB
INR BLD: 2.4
PROTIME: 29.2 SECONDS

## 2021-11-24 PROCEDURE — 99211 OFF/OP EST MAY X REQ PHY/QHP: CPT

## 2021-11-24 PROCEDURE — 85610 PROTHROMBIN TIME: CPT

## 2022-01-06 ENCOUNTER — HOSPITAL ENCOUNTER (OUTPATIENT)
Dept: PHARMACY | Age: 62
Setting detail: THERAPIES SERIES
Discharge: HOME | End: 2022-01-06
Payer: COMMERCIAL

## 2022-01-06 DIAGNOSIS — I26.99 PULMONARY EMBOLISM, OTHER, UNSPECIFIED CHRONICITY, UNSPECIFIED WHETHER ACUTE COR PULMONALE PRESENT (HCC): Primary | ICD-10-CM

## 2022-01-06 DIAGNOSIS — I82.401 DEEP VEIN THROMBOSIS (DVT) OF RIGHT LOWER EXTREMITY, UNSPECIFIED CHRONICITY, UNSPECIFIED VEIN (HCC): ICD-10-CM

## 2022-01-06 LAB
INR BLD: 2.4
PROTIME: 28.5 SECONDS

## 2022-01-06 PROCEDURE — 99211 OFF/OP EST MAY X REQ PHY/QHP: CPT

## 2022-01-06 PROCEDURE — 85610 PROTHROMBIN TIME: CPT

## 2022-01-06 NOTE — PROGRESS NOTES
Patient seen in clinic for warfarin management due to DVT and PE with an INR goal of 2.0-3.0. Estimated duration of therapy is indefinite. Patient states compliant all of the time with regimen. No bleeding or thromboembolic side effects noted. No significant med or dietary changes. No significant recent illness or disease state changes. PT/INR done in office per protocol. INR is 2.4 which is therapeutic. Warfarin regimen will be continued at current dose of 15mg daily. Will retest in 6 weeks. Patient understands dosing directions and information discussed. Dosing schedule and follow up appointment given to patient. Progress note routed to referring physicians office. Discussed with patient the Pharmacist Collaborative Practice Agreement. Patient provided verbal and/or electronic (ex. [employer]) consent to participate in the collaborative practice agreement between the pharmacist and referred patient. This is in lieu of paper consent due to COVID-19 precautions and the use of remote/virtual visits.        For Pharmacy Admin Tracking Only     Intervention Detail:    Total # of Interventions Recommended: 0   Total # of Interventions Accepted: 0   Time Spent (min): 15

## 2022-02-16 ENCOUNTER — HOSPITAL ENCOUNTER (OUTPATIENT)
Dept: PHARMACY | Age: 62
Setting detail: THERAPIES SERIES
Discharge: HOME | End: 2022-02-16
Payer: COMMERCIAL

## 2022-02-16 DIAGNOSIS — I82.4Y1 DEEP VEIN THROMBOSIS (DVT) OF PROXIMAL VEIN OF RIGHT LOWER EXTREMITY, UNSPECIFIED CHRONICITY (HCC): ICD-10-CM

## 2022-02-16 DIAGNOSIS — I26.99 PULMONARY EMBOLISM, UNSPECIFIED CHRONICITY, UNSPECIFIED PULMONARY EMBOLISM TYPE, UNSPECIFIED WHETHER ACUTE COR PULMONALE PRESENT (HCC): Primary | ICD-10-CM

## 2022-02-16 LAB
INR BLD: 2.4
PROTIME: 29.2 SECONDS

## 2022-02-16 PROCEDURE — 99211 OFF/OP EST MAY X REQ PHY/QHP: CPT

## 2022-02-16 PROCEDURE — 85610 PROTHROMBIN TIME: CPT

## 2022-02-16 NOTE — PROGRESS NOTES
Patient seen in clinic for warfarin management due to DVT and PE with an INR goal of 2.0-3.0. Estimated duration of therapy is indefinite. Patient states compliant all of the time with regimen. No bleeding or thromboembolic side effects noted. No significant med or dietary changes. No significant recent illness or disease state changes. PT/INR done in office per protocol. INR is 2.4 which is therapeutic. Warfarin regimen will be continued at current dose of 15mg daily. Will retest in 6 weeks. Patient understands dosing directions and information discussed. Dosing schedule and follow up appointment given to patient. Progress note routed to referring physicians office. Discussed with patient the Pharmacist Collaborative Practice Agreement. Patient provided verbal and/or electronic (ex. [employer]) consent to participate in the collaborative practice agreement between the pharmacist and referred patient. This is in lieu of paper consent due to COVID-19 precautions and the use of remote/virtual visits.        For Pharmacy Admin Tracking Only     Intervention Detail:    Total # of Interventions Recommended: 0   Total # of Interventions Accepted: 0   Time Spent (min): 15

## 2022-03-30 ENCOUNTER — HOSPITAL ENCOUNTER (OUTPATIENT)
Dept: PHARMACY | Age: 62
Setting detail: THERAPIES SERIES
Discharge: HOME | End: 2022-03-30
Payer: COMMERCIAL

## 2022-03-30 DIAGNOSIS — I82.401 DEEP VEIN THROMBOSIS (DVT) OF RIGHT LOWER EXTREMITY, UNSPECIFIED CHRONICITY, UNSPECIFIED VEIN (HCC): ICD-10-CM

## 2022-03-30 DIAGNOSIS — I26.99 PULMONARY EMBOLISM, UNSPECIFIED CHRONICITY, UNSPECIFIED PULMONARY EMBOLISM TYPE, UNSPECIFIED WHETHER ACUTE COR PULMONALE PRESENT (HCC): Primary | ICD-10-CM

## 2022-03-30 LAB
INR BLD: 2.4
PROTIME: 29.1 SECONDS

## 2022-03-30 PROCEDURE — 99211 OFF/OP EST MAY X REQ PHY/QHP: CPT

## 2022-03-30 PROCEDURE — 85610 PROTHROMBIN TIME: CPT

## 2022-03-30 NOTE — PROGRESS NOTES
Patient seen in clinic for warfarin management due to DVT and PE with an INR goal of 2.0-3.0.  Estimated duration of therapy is indefinite. Patient states compliant all of the time with regimen. No bleeding or thromboembolic side effects noted. No significant med or dietary changes. No significant recent illness or disease state changes. PT/INR done in office per protocol. INR is 2.4 which is therapeutic. Warfarin regimen will be continued at current dose of 15mg daily. Will retest in 6 weeks. Patient understands dosing directions and information discussed. Dosing schedule and follow up appointment given to patient. Progress note routed to referring physicians office. Discussed with patient the Pharmacist Collaborative Practice Agreement. Patient provided verbal and/or electronic (ex. [employer]) consent to participate in the collaborative practice agreement between the pharmacist and referred patient. This is in lieu of paper consent due to COVID-19 precautions and the use of remote/virtual visits.        For Pharmacy Admin Tracking Only     Intervention Detail:    Total # of Interventions Recommended: 0   Total # of Interventions Accepted: 0   Time Spent (min): 15

## 2022-04-08 ENCOUNTER — HOSPITAL ENCOUNTER (OUTPATIENT)
Dept: VASCULAR LAB | Age: 62
Discharge: HOME | End: 2022-04-08
Payer: COMMERCIAL

## 2022-04-08 DIAGNOSIS — M79.661 PAIN OF RIGHT LOWER LEG: ICD-10-CM

## 2022-04-08 DIAGNOSIS — M79.662 PAIN OF LEFT LOWER LEG: ICD-10-CM

## 2022-04-08 DIAGNOSIS — Z86.718 PERSONAL HISTORY OF DVT (DEEP VEIN THROMBOSIS): ICD-10-CM

## 2022-04-08 PROCEDURE — 93970 EXTREMITY STUDY: CPT

## 2022-05-16 ENCOUNTER — HOSPITAL ENCOUNTER (OUTPATIENT)
Dept: PHARMACY | Age: 62
Setting detail: THERAPIES SERIES
Discharge: HOME | End: 2022-05-16
Payer: COMMERCIAL

## 2022-05-16 DIAGNOSIS — I26.99 PULMONARY EMBOLISM, UNSPECIFIED CHRONICITY, UNSPECIFIED PULMONARY EMBOLISM TYPE, UNSPECIFIED WHETHER ACUTE COR PULMONALE PRESENT (HCC): Primary | ICD-10-CM

## 2022-05-16 DIAGNOSIS — I82.401 DEEP VEIN THROMBOSIS (DVT) OF RIGHT LOWER EXTREMITY, UNSPECIFIED CHRONICITY, UNSPECIFIED VEIN (HCC): ICD-10-CM

## 2022-05-16 LAB
INR BLD: 2.8
PROTIME: 33.6 SECONDS

## 2022-05-16 PROCEDURE — 99211 OFF/OP EST MAY X REQ PHY/QHP: CPT

## 2022-05-16 PROCEDURE — 85610 PROTHROMBIN TIME: CPT

## 2022-05-16 NOTE — PROGRESS NOTES
Patient seen in clinic for warfarin management due to DVT and PE with an INR goal of 2.0-3.0.  Estimated duration of therapy is indefinite.     Patient states compliant all of the time with regimen. No bleeding or thromboembolic side effects noted. No significant med or dietary changes. No significant recent illness or disease state changes. PT/INR done in office per protocol. INR is 2.8 which is therapeutic. Warfarin regimen will be continued at current dose of 15mg daily. Will retest in 6 weeks. Patient understands dosing directions and information discussed. Dosing schedule and follow up appointment given to patient. Progress note routed to referring physicians office. Discussed with patient the Pharmacist Collaborative Practice Agreement. Patient provided verbal and/or electronic (ex. [employer]) consent to participate in the collaborative practice agreement between the pharmacist and referred patient. This is in lieu of paper consent due to COVID-19 precautions and the use of remote/virtual visits.        For Pharmacy Admin Tracking Only     Intervention Detail:    Total # of Interventions Recommended: 0   Total # of Interventions Accepted: 0   Time Spent (min): 15

## 2022-06-27 ENCOUNTER — HOSPITAL ENCOUNTER (OUTPATIENT)
Dept: PHARMACY | Age: 62
Setting detail: THERAPIES SERIES
Discharge: HOME | End: 2022-06-27
Payer: COMMERCIAL

## 2022-06-27 DIAGNOSIS — I26.99 PULMONARY EMBOLISM, OTHER, UNSPECIFIED CHRONICITY, UNSPECIFIED WHETHER ACUTE COR PULMONALE PRESENT (HCC): Primary | ICD-10-CM

## 2022-06-27 DIAGNOSIS — I82.491 DEEP VEIN THROMBOSIS (DVT) OF OTHER VEIN OF RIGHT LOWER EXTREMITY, UNSPECIFIED CHRONICITY (HCC): ICD-10-CM

## 2022-06-27 LAB
INR BLD: 3.3
PROTIME: 39.8 SECONDS

## 2022-06-27 PROCEDURE — 85610 PROTHROMBIN TIME: CPT

## 2022-06-27 PROCEDURE — 99212 OFFICE O/P EST SF 10 MIN: CPT

## 2022-06-27 NOTE — PROGRESS NOTES
Patient seen in clinic for warfarin management due to DVT and PE with an INR goal of 2.0-3.0. Estimated duration of therapy is indefinite. Patient states compliant all of the time with regimen. No bleeding or thromboembolic side effects noted. No significant med changes. Patient has had less vitamin K in the diet recently. No significant recent illness or disease state changes. PT/INR done in office per protocol. INR is 3.3 which is just above goal.     Warfarin regimen will be continued at current dose of 15mg daily. Patient plans to increase dietary vitamin K. Will retest in 4 weeks. Patient understands dosing directions and information discussed. Dosing schedule and follow up appointment given to patient. Progress note routed to referring physicians office. Discussed with patient the Pharmacist Collaborative Practice Agreement. Patient provided verbal and/or electronic (ex. [employer]) consent to participate in the collaborative practice agreement between the pharmacist and referred patient. This is in lieu of paper consent due to COVID-19 precautions and the use of remote/virtual visits.        For Pharmacy Admin Tracking Only     Intervention Detail:    Total # of Interventions Recommended: 0   Total # of Interventions Accepted: 0   Time Spent (min): 15

## 2022-07-21 ENCOUNTER — HOSPITAL ENCOUNTER (OUTPATIENT)
Age: 62
Setting detail: SPECIMEN
Discharge: HOME | End: 2022-07-21
Payer: COMMERCIAL

## 2022-07-21 LAB
ANION GAP SERPL CALCULATED.3IONS-SCNC: 10 MMOL/L (ref 9–17)
BUN BLDV-MCNC: 16 MG/DL (ref 8–23)
CALCIUM SERPL-MCNC: 9.1 MG/DL (ref 8.6–10.4)
CHLORIDE BLD-SCNC: 105 MMOL/L (ref 98–107)
CHOLESTEROL, FASTING: 187 MG/DL
CHOLESTEROL/HDL RATIO: 5.1
CO2: 26 MMOL/L (ref 20–31)
CREAT SERPL-MCNC: 0.91 MG/DL (ref 0.7–1.2)
GFR AFRICAN AMERICAN: >60 ML/MIN
GFR NON-AFRICAN AMERICAN: >60 ML/MIN
GFR SERPL CREATININE-BSD FRML MDRD: NORMAL ML/MIN/{1.73_M2}
GLUCOSE BLD-MCNC: 95 MG/DL (ref 70–99)
HDLC SERPL-MCNC: 37 MG/DL
LDL CHOLESTEROL: 122 MG/DL (ref 0–130)
POTASSIUM SERPL-SCNC: 4 MMOL/L (ref 3.7–5.3)
SODIUM BLD-SCNC: 141 MMOL/L (ref 135–144)
TRIGLYCERIDE, FASTING: 138 MG/DL

## 2022-07-21 PROCEDURE — 36415 COLL VENOUS BLD VENIPUNCTURE: CPT

## 2022-07-21 PROCEDURE — 80048 BASIC METABOLIC PNL TOTAL CA: CPT

## 2022-07-21 PROCEDURE — 80061 LIPID PANEL: CPT

## 2022-07-25 ENCOUNTER — HOSPITAL ENCOUNTER (OUTPATIENT)
Age: 62
Setting detail: SPECIMEN
Discharge: HOME | End: 2022-07-25
Payer: COMMERCIAL

## 2022-07-25 ENCOUNTER — HOSPITAL ENCOUNTER (OUTPATIENT)
Dept: PHARMACY | Age: 62
Setting detail: THERAPIES SERIES
Discharge: HOME | End: 2022-07-25
Payer: COMMERCIAL

## 2022-07-25 DIAGNOSIS — I26.99 OTHER PULMONARY EMBOLISM WITHOUT ACUTE COR PULMONALE, UNSPECIFIED CHRONICITY (HCC): Primary | ICD-10-CM

## 2022-07-25 DIAGNOSIS — I82.401 DEEP VEIN THROMBOSIS (DVT) OF RIGHT LOWER EXTREMITY, UNSPECIFIED CHRONICITY, UNSPECIFIED VEIN (HCC): ICD-10-CM

## 2022-07-25 LAB
-: NORMAL
BILIRUBIN URINE: NEGATIVE
CASTS UA: NORMAL /LPF (ref 0–8)
COLOR: ABNORMAL
CREATININE URINE: 362.9 MG/DL (ref 39–259)
EPITHELIAL CELLS UA: NORMAL /HPF (ref 0–5)
GLUCOSE URINE: NEGATIVE
INR BLD: 5
KETONES, URINE: NEGATIVE
LEUKOCYTE ESTERASE, URINE: NEGATIVE
NITRITE, URINE: NEGATIVE
PH UA: 6 (ref 5–8)
PROTEIN UA: ABNORMAL
PROTIME: 60.1 SECONDS
RBC UA: NORMAL /HPF (ref 0–4)
SPECIFIC GRAVITY UA: 1.03 (ref 1–1.03)
TOTAL PROTEIN, URINE: 46 MG/DL
TURBIDITY: CLEAR
URINE HGB: NEGATIVE
URINE TOTAL PROTEIN CREATININE RATIO: 0.13 (ref 0–0.2)
UROBILINOGEN, URINE: NORMAL
WBC UA: NORMAL /HPF (ref 0–5)

## 2022-07-25 PROCEDURE — 85610 PROTHROMBIN TIME: CPT

## 2022-07-25 PROCEDURE — 99212 OFFICE O/P EST SF 10 MIN: CPT

## 2022-07-25 PROCEDURE — 81001 URINALYSIS AUTO W/SCOPE: CPT

## 2022-07-25 PROCEDURE — 82570 ASSAY OF URINE CREATININE: CPT

## 2022-07-25 PROCEDURE — 84156 ASSAY OF PROTEIN URINE: CPT

## 2022-07-25 NOTE — PROGRESS NOTES
Patient seen in clinic for warfarin management due to DVT and PE with an INR goal of 2.0-3.0. Estimated duration of therapy is indefinite. Patient states compliant all of the time with regimen. No bleeding or thromboembolic side effects noted. No significant med changes. Patient states he has not had any greens recently. Patient has been dealing with plantars fascitis but doesn't note there to be any more pain than usual.  He is not taking any medications to treat at this time. PT/INR done in office per protocol. INR is 5.0 which is supratherapeutic possibly from combination of the above. Warfarin regimen will be held for 2 days, then continue 15mg daily. Will retest in 1 week. Patient understands dosing directions and information discussed. Dosing schedule and follow up appointment given to patient. Progress note routed to referring physicians office. Discussed with patient the Pharmacist Collaborative Practice Agreement. Patient provided verbal and/or electronic (ex. [employer]) consent to participate in the collaborative practice agreement between the pharmacist and referred patient. This is in lieu of paper consent due to COVID-19 precautions and the use of remote/virtual visits.        For Pharmacy Admin Tracking Only    Intervention Detail: Dose Adjustment: 1, reason: Therapy De-escalation  Total # of Interventions Recommended: 1  Total # of Interventions Accepted: 1  Time Spent (min): 15

## 2022-08-01 ENCOUNTER — HOSPITAL ENCOUNTER (OUTPATIENT)
Dept: PHARMACY | Age: 62
Setting detail: THERAPIES SERIES
Discharge: HOME | End: 2022-08-01
Payer: COMMERCIAL

## 2022-08-01 DIAGNOSIS — I26.99 OTHER PULMONARY EMBOLISM WITHOUT ACUTE COR PULMONALE, UNSPECIFIED CHRONICITY (HCC): Primary | ICD-10-CM

## 2022-08-01 DIAGNOSIS — I82.401 DEEP VEIN THROMBOSIS (DVT) OF RIGHT LOWER EXTREMITY, UNSPECIFIED CHRONICITY, UNSPECIFIED VEIN (HCC): ICD-10-CM

## 2022-08-01 LAB
INR BLD: 2.2
PROTIME: 26.3 SECONDS

## 2022-08-01 PROCEDURE — 85610 PROTHROMBIN TIME: CPT

## 2022-08-01 PROCEDURE — 99212 OFFICE O/P EST SF 10 MIN: CPT

## 2022-08-08 NOTE — PROGRESS NOTES
Patient seen in clinic for warfarin management due to DVT and PE with an INR goal of 2.0-3.0. Estimated duration of therapy is indefinite. Patient states compliant all of the time with regimen. No bleeding or thromboembolic side effects noted. No significant med changes. Patient states he has had less green in his diet than usual. No significant recent illness or disease state changes. PT/INR done in office per protocol. INR is 3.2 which is just above goal.     Warfarin regimen will be continued at current dose of 15mg daily. Patient plans to increase dietary vitamin K. Will retest in 5 weeks. Patient understands dosing directions and information discussed. Dosing schedule and follow up appointment given to patient. Progress note routed to referring physicians office. Discussed with patient the Pharmacist Collaborative Practice Agreement. Patient provided verbal and/or electronic (ex. [employer]) consent to participate in the collaborative practice agreement between the pharmacist and referred patient. This is in lieu of paper consent due to COVID-19 precautions and the use of remote/virtual visits.        For Pharmacy Admin Tracking Only     Intervention Detail:    Total # of Interventions Recommended: 0   Total # of Interventions Accepted: 0   Time Spent (min): 15 [Fatigue] : no fatigue [Recent Weight Gain (___ Lbs)] : no recent weight gain [Blurred Vision] : no blurred vision [Dysphagia] : no dysphagia [Neck Pain] : no neck pain [Chest Pain] : no chest pain [Palpitations] : no palpitations [Fast Heart Rate] : heart rate is not fast [Lower Ext Edema] : no lower extremity edema [Shortness Of Breath] : no shortness of breath [Wheezing] : no wheezing [SOB on Exertion] : no shortness of breath on exertion [Nausea] : no nausea [Constipation] : no constipation [Vomiting] : no vomiting [Diarrhea] : no diarrhea [Headaches] : no headaches [Dizziness] : no dizziness [Tremors] : no tremors [Pain/Numbness of Digits] : no pain/numbness of digits [Cold Intolerance] : no cold intolerance [Heat Intolerance] : no heat intolerance

## 2022-08-15 ENCOUNTER — HOSPITAL ENCOUNTER (OUTPATIENT)
Dept: PHARMACY | Age: 62
Setting detail: THERAPIES SERIES
Discharge: HOME | End: 2022-08-15
Payer: COMMERCIAL

## 2022-08-15 DIAGNOSIS — I82.401 DEEP VEIN THROMBOSIS (DVT) OF RIGHT LOWER EXTREMITY, UNSPECIFIED CHRONICITY, UNSPECIFIED VEIN (HCC): ICD-10-CM

## 2022-08-15 DIAGNOSIS — I26.99 OTHER PULMONARY EMBOLISM WITHOUT ACUTE COR PULMONALE, UNSPECIFIED CHRONICITY (HCC): Primary | ICD-10-CM

## 2022-08-15 LAB
INR BLD: 2.6
PROTIME: 31.3 SECONDS

## 2022-08-15 PROCEDURE — 85610 PROTHROMBIN TIME: CPT

## 2022-08-15 PROCEDURE — 99211 OFF/OP EST MAY X REQ PHY/QHP: CPT

## 2022-08-15 NOTE — PROGRESS NOTES
Patient seen in clinic for warfarin management due to PE with an INR goal of 2.0-3.0. Estimated duration of therapy is indefinite. Patient states compliant all of the time with regimen. No bleeding or thromboembolic side effects noted. No significant med or dietary changes. No significant recent illness or disease state changes. PT/INR done in office per protocol. INR is 2.6 which is therapeutic. Warfarin regimen will be continued with reduced maintenance dose of 10mg Tues/Fri and 15mg all other days. Will retest in 4 weeks. Patient understands dosing directions and information discussed. Dosing schedule and follow up appointment given to patient. Progress note routed to referring physicians office. Discussed with patient the Pharmacist Collaborative Practice Agreement. Patient provided verbal and/or electronic (ex. [employer]) consent to participate in the collaborative practice agreement between the pharmacist and referred patient. This is in lieu of paper consent due to COVID-19 precautions and the use of remote/virtual visits.        For Pharmacy Admin Tracking Only    Intervention Detail: Dose Adjustment: 1, reason: Therapy De-escalation  Total # of Interventions Recommended: 1  Total # of Interventions Accepted: 1  Time Spent (min): 15

## 2022-09-12 ENCOUNTER — HOSPITAL ENCOUNTER (OUTPATIENT)
Dept: PHARMACY | Age: 62
Setting detail: THERAPIES SERIES
Discharge: HOME | End: 2022-09-12
Payer: COMMERCIAL

## 2022-09-12 DIAGNOSIS — I26.99 OTHER PULMONARY EMBOLISM WITHOUT ACUTE COR PULMONALE, UNSPECIFIED CHRONICITY (HCC): Primary | ICD-10-CM

## 2022-09-12 DIAGNOSIS — I82.401 DEEP VEIN THROMBOSIS (DVT) OF RIGHT LOWER EXTREMITY, UNSPECIFIED CHRONICITY, UNSPECIFIED VEIN (HCC): ICD-10-CM

## 2022-09-12 LAB
INR BLD: 2.4
PROTIME: 29.2 SECONDS

## 2022-09-12 PROCEDURE — 85610 PROTHROMBIN TIME: CPT

## 2022-09-12 PROCEDURE — 99211 OFF/OP EST MAY X REQ PHY/QHP: CPT

## 2022-09-12 NOTE — PROGRESS NOTES
Patient seen in clinic for warfarin management due to DVT with an INR goal of 2.0-3.0. Estimated duration of therapy is indefinite. Patient states may have missed some doses. No bleeding or thromboembolic side effects noted. No significant med or dietary changes. No significant recent illness or disease state changes. PT/INR done in office per protocol. INR is 2.4 which is therapeutic. Warfarin regimen will be continued at current dose of 10 mg on Tues/Fri and 15 mg all other days. Will retest in 4 weeks. Patient understands dosing directions and information discussed. Dosing schedule and follow up appointment given to patient. Progress note routed to referring physicians office. Discussed with patient the Pharmacist Collaborative Practice Agreement. Patient provided verbal and/or electronic (ex. [employer]) consent to participate in the collaborative practice agreement between the pharmacist and referred patient. This is in lieu of paper consent due to COVID-19 precautions and the use of remote/virtual visits.        For Pharmacy Admin Tracking Only    Intervention Detail:   Total # of Interventions Recommended: 0  Total # of Interventions Accepted: 0  Time Spent (min): 15

## 2022-10-10 ENCOUNTER — HOSPITAL ENCOUNTER (OUTPATIENT)
Dept: PHARMACY | Age: 62
Setting detail: THERAPIES SERIES
Discharge: HOME | End: 2022-10-10
Payer: COMMERCIAL

## 2022-10-10 DIAGNOSIS — I82.401 DEEP VEIN THROMBOSIS (DVT) OF RIGHT LOWER EXTREMITY, UNSPECIFIED CHRONICITY, UNSPECIFIED VEIN (HCC): ICD-10-CM

## 2022-10-10 DIAGNOSIS — I26.99 OTHER PULMONARY EMBOLISM WITHOUT ACUTE COR PULMONALE, UNSPECIFIED CHRONICITY (HCC): Primary | ICD-10-CM

## 2022-10-10 LAB
INR BLD: 3.6
PROTIME: 42.8 SECONDS

## 2022-10-10 PROCEDURE — 85610 PROTHROMBIN TIME: CPT

## 2022-10-10 PROCEDURE — 99212 OFFICE O/P EST SF 10 MIN: CPT

## 2022-10-10 NOTE — PROGRESS NOTES
Patient seen in clinic for warfarin management due to DVT and PE with an INR goal of 2.0-3.0. Estimated duration of therapy is indefinite. Patient states compliant all of the time with regimen. No bleeding or thromboembolic side effects noted. No significant med changes. Patient states he had less green in his diet compared to usual. No significant recent illness or disease state changes. PT/INR done in office per protocol. INR is 3.6 which is supratherapeutic. Warfarin regimen will be held for tomorrow (already took today) then continue 10mg Tue/Fri and 15mg all other days. Will retest in 2 weeks to assess. Patient understands dosing directions and information discussed. Dosing schedule and follow up appointment given to patient. Progress note routed to referring physicians office. Discussed with patient the Pharmacist Collaborative Practice Agreement. Patient provided verbal and/or electronic (ex. [employer]) consent to participate in the collaborative practice agreement between the pharmacist and referred patient. This is in lieu of paper consent due to COVID-19 precautions and the use of remote/virtual visits.        For Pharmacy Admin Tracking Only    Intervention Detail: Dose Adjustment: 1, reason: Therapy De-escalation  Total # of Interventions Recommended: 1  Total # of Interventions Accepted: 1  Time Spent (min): 15

## 2022-10-24 ENCOUNTER — HOSPITAL ENCOUNTER (OUTPATIENT)
Dept: PHARMACY | Age: 62
Setting detail: THERAPIES SERIES
Discharge: HOME | End: 2022-10-24
Payer: COMMERCIAL

## 2022-10-24 DIAGNOSIS — I26.99 OTHER PULMONARY EMBOLISM WITHOUT ACUTE COR PULMONALE, UNSPECIFIED CHRONICITY (HCC): Primary | ICD-10-CM

## 2022-10-24 DIAGNOSIS — I82.401 DEEP VEIN THROMBOSIS (DVT) OF RIGHT LOWER EXTREMITY, UNSPECIFIED CHRONICITY, UNSPECIFIED VEIN (HCC): ICD-10-CM

## 2022-10-24 LAB
INR BLD: 2.3
PROTIME: 27.3 SECONDS

## 2022-10-24 PROCEDURE — 99211 OFF/OP EST MAY X REQ PHY/QHP: CPT

## 2022-10-24 PROCEDURE — 85610 PROTHROMBIN TIME: CPT

## 2022-11-21 ENCOUNTER — TELEPHONE (OUTPATIENT)
Dept: PHARMACY | Age: 62
End: 2022-11-21

## 2022-11-21 DIAGNOSIS — I26.99 OTHER PULMONARY EMBOLISM WITHOUT ACUTE COR PULMONALE, UNSPECIFIED CHRONICITY (HCC): Primary | ICD-10-CM

## 2022-11-21 DIAGNOSIS — I82.401 DEEP VEIN THROMBOSIS (DVT) OF RIGHT LOWER EXTREMITY, UNSPECIFIED CHRONICITY, UNSPECIFIED VEIN (HCC): ICD-10-CM

## 2022-11-22 ENCOUNTER — HOSPITAL ENCOUNTER (OUTPATIENT)
Dept: PHARMACY | Age: 62
Setting detail: THERAPIES SERIES
Discharge: HOME | End: 2022-11-22
Payer: COMMERCIAL

## 2022-11-22 DIAGNOSIS — I82.401 DEEP VEIN THROMBOSIS (DVT) OF RIGHT LOWER EXTREMITY, UNSPECIFIED CHRONICITY, UNSPECIFIED VEIN (HCC): ICD-10-CM

## 2022-11-22 DIAGNOSIS — I26.99 OTHER PULMONARY EMBOLISM WITHOUT ACUTE COR PULMONALE, UNSPECIFIED CHRONICITY (HCC): Primary | ICD-10-CM

## 2022-11-22 LAB
INR BLD: 2.1
PROTIME: 24.7 SECONDS

## 2022-11-22 PROCEDURE — 99211 OFF/OP EST MAY X REQ PHY/QHP: CPT

## 2022-11-22 PROCEDURE — 85610 PROTHROMBIN TIME: CPT

## 2022-11-22 NOTE — PROGRESS NOTES
Patient seen in clinic for warfarin management due to DVT and PE with an INR goal of 2.0-3.0. Estimated duration of therapy is indefinite. Patient states compliant all of the time with regimen. No bleeding or thromboembolic side effects noted. No significant med or dietary changes. No significant recent illness or disease state changes. PT/INR done in office per protocol. INR is 2.1 which is therapeutic. Warfarin regimen will be continued at current dose 10mg Tue/Fri , and 15mg on all other days. Will retest in 4 weeks. Patient understands dosing directions and information discussed. Dosing schedule and follow up appointment given to patient. Progress note routed to referring physicians office. Discussed with patient the Pharmacist Collaborative Practice Agreement. Patient provided verbal and/or electronic (ex. [employer]) consent to participate in the collaborative practice agreement between the pharmacist and referred patient. This is in lieu of paper consent due to COVID-19 precautions and the use of remote/virtual visits.        For Pharmacy Admin Tracking Only    Intervention Detail:   Total # of Interventions Recommended: 0  Total # of Interventions Accepted: 0  Time Spent (min): 15

## 2022-12-20 ENCOUNTER — HOSPITAL ENCOUNTER (OUTPATIENT)
Dept: PHARMACY | Age: 62
Setting detail: THERAPIES SERIES
Discharge: HOME | End: 2022-12-20
Payer: COMMERCIAL

## 2022-12-20 DIAGNOSIS — I82.401 DEEP VEIN THROMBOSIS (DVT) OF RIGHT LOWER EXTREMITY, UNSPECIFIED CHRONICITY, UNSPECIFIED VEIN (HCC): ICD-10-CM

## 2022-12-20 DIAGNOSIS — I26.99 OTHER PULMONARY EMBOLISM WITHOUT ACUTE COR PULMONALE, UNSPECIFIED CHRONICITY (HCC): Primary | ICD-10-CM

## 2022-12-20 LAB
INR BLD: 2.1
PROTIME: 25.3 SECONDS

## 2022-12-20 PROCEDURE — 85610 PROTHROMBIN TIME: CPT

## 2022-12-20 PROCEDURE — 99211 OFF/OP EST MAY X REQ PHY/QHP: CPT

## 2022-12-20 NOTE — PROGRESS NOTES
Patient seen in clinic for warfarin management due to DVT and PE with an INR goal of 2.0-3.0. Estimated duration of therapy is indefinite. Patient states compliant all of the time with regimen. No bleeding or thromboembolic side effects noted. No significant med or dietary changes. No significant recent illness or disease state changes. PT/INR done in office per protocol. INR is 2.1 which is therapeutic. Warfarin regimen will be continued at current dose 10mg Tues/Fri, 15mg all other days. Will retest in 4 weeks. Patient understands dosing directions and information discussed. Dosing schedule and follow up appointment given to patient. Progress note routed to referring physicians office. Discussed with patient the Pharmacist Collaborative Practice Agreement. Patient provided verbal and/or electronic (ex. [employer]) consent to participate in the collaborative practice agreement between the pharmacist and referred patient. This is in lieu of paper consent due to COVID-19 precautions and the use of remote/virtual visits.        For Pharmacy Admin Tracking Only    Intervention Detail:   Total # of Interventions Recommended: 0  Total # of Interventions Accepted: 0  Time Spent (min): 15

## 2023-01-17 ENCOUNTER — HOSPITAL ENCOUNTER (OUTPATIENT)
Dept: PHARMACY | Age: 63
Setting detail: THERAPIES SERIES
Discharge: HOME | End: 2023-01-17
Payer: COMMERCIAL

## 2023-01-17 DIAGNOSIS — I26.99 OTHER PULMONARY EMBOLISM WITHOUT ACUTE COR PULMONALE, UNSPECIFIED CHRONICITY (HCC): Primary | ICD-10-CM

## 2023-01-17 DIAGNOSIS — I82.401 DEEP VEIN THROMBOSIS (DVT) OF RIGHT LOWER EXTREMITY, UNSPECIFIED CHRONICITY, UNSPECIFIED VEIN (HCC): ICD-10-CM

## 2023-01-17 LAB
INR BLD: 2.5
PROTIME: 29.7 SECONDS

## 2023-01-17 PROCEDURE — 85610 PROTHROMBIN TIME: CPT

## 2023-01-17 PROCEDURE — 99211 OFF/OP EST MAY X REQ PHY/QHP: CPT

## 2023-01-17 NOTE — PROGRESS NOTES
Patient seen in clinic for warfarin management due to DVT and PE with an INR goal of 2.0-3.0. Estimated duration of therapy is indefinite. Patient states compliant all of the time with regimen. No bleeding or thromboembolic side effects noted. No significant med or dietary changes. No significant recent illness or disease state changes. PT/INR done in office per protocol. INR is 2.5 which is therapeutic. Warfarin regimen will be continued at current dose 10 mg Tue/Fri, 15 mg all other days. Will retest in 4 weeks. Patient understands dosing directions and information discussed. Dosing schedule and follow up appointment given to patient. Progress note routed to referring physicians office. Discussed with patient the Pharmacist Collaborative Practice Agreement. Patient provided verbal and/or electronic (ex. [employer]) consent to participate in the collaborative practice agreement between the pharmacist and referred patient. This is in lieu of paper consent due to COVID-19 precautions and the use of remote/virtual visits.        For Pharmacy Admin Tracking Only    Intervention Detail:   Total # of Interventions Recommended: 0  Total # of Interventions Accepted: 0  Time Spent (min): 15

## 2023-02-15 ENCOUNTER — HOSPITAL ENCOUNTER (OUTPATIENT)
Dept: PHARMACY | Age: 63
Setting detail: THERAPIES SERIES
Discharge: HOME | End: 2023-02-15
Payer: COMMERCIAL

## 2023-02-15 DIAGNOSIS — I82.401 DEEP VEIN THROMBOSIS (DVT) OF RIGHT LOWER EXTREMITY, UNSPECIFIED CHRONICITY, UNSPECIFIED VEIN (HCC): ICD-10-CM

## 2023-02-15 DIAGNOSIS — I26.99 OTHER PULMONARY EMBOLISM WITHOUT ACUTE COR PULMONALE, UNSPECIFIED CHRONICITY (HCC): Primary | ICD-10-CM

## 2023-02-15 LAB
INR BLD: 2.6
PROTIME: 30.6 SECONDS

## 2023-02-15 PROCEDURE — 85610 PROTHROMBIN TIME: CPT

## 2023-02-15 PROCEDURE — 99211 OFF/OP EST MAY X REQ PHY/QHP: CPT

## 2023-02-15 NOTE — PROGRESS NOTES
Patient seen in clinic for warfarin management due to DVT and PE with an INR goal of 2.0-3.0. Estimated duration of therapy is indefinite. Patient states compliant all of the time with regimen. No bleeding or thromboembolic side effects noted. No significant med or dietary changes. No significant recent illness or disease state changes. PT/INR done in office per protocol. INR is 2.6 which is therapeutic. Warfarin regimen will be continued at current dose of 10mg tues/fri and 15mg all other days. Will retest in 5 weeks. Patient understands dosing directions and information discussed. Dosing schedule and follow up appointment given to patient. Progress note routed to referring physicians office. Discussed with patient the Pharmacist Collaborative Practice Agreement. Patient provided verbal and/or electronic (ex. [employer]) consent to participate in the collaborative practice agreement between the pharmacist and referred patient. This is in lieu of paper consent due to COVID-19 precautions and the use of remote/virtual visits.        For Pharmacy Admin Tracking Only    Intervention Detail:   Total # of Interventions Recommended: 0  Total # of Interventions Accepted: 0  Time Spent (min): 15

## 2023-03-22 ENCOUNTER — HOSPITAL ENCOUNTER (OUTPATIENT)
Dept: PHARMACY | Age: 63
Setting detail: THERAPIES SERIES
Discharge: HOME | End: 2023-03-22
Payer: COMMERCIAL

## 2023-03-22 DIAGNOSIS — I82.401 DEEP VEIN THROMBOSIS (DVT) OF RIGHT LOWER EXTREMITY, UNSPECIFIED CHRONICITY, UNSPECIFIED VEIN (HCC): ICD-10-CM

## 2023-03-22 DIAGNOSIS — I26.99 OTHER PULMONARY EMBOLISM WITHOUT ACUTE COR PULMONALE, UNSPECIFIED CHRONICITY (HCC): Primary | ICD-10-CM

## 2023-03-22 LAB
INR BLD: 2
PROTIME: 23.9 SECONDS

## 2023-03-22 PROCEDURE — 99211 OFF/OP EST MAY X REQ PHY/QHP: CPT

## 2023-03-22 PROCEDURE — 85610 PROTHROMBIN TIME: CPT

## 2023-03-22 NOTE — PROGRESS NOTES
Patient seen in clinic for warfarin management due to DVT and PE with an INR goal of 2.0-3.0. Estimated duration of therapy is indefinite. Patient states compliant all of the time with regimen. No bleeding or thromboembolic side effects noted. No significant med or dietary changes. No significant recent illness or disease state changes. PT/INR done in office per protocol. INR is 2.0 which is therapeutic. Warfarin regimen will be continued at current dose of 10mg tues/fri and 15mg all other days. Will retest in 6 weeks. Patient understands dosing directions and information discussed. Dosing schedule and follow up appointment given to patient. Progress note routed to referring physicians office. Discussed with patient the Pharmacist Collaborative Practice Agreement. Patient provided verbal and/or electronic (ex. [employer]) consent to participate in the collaborative practice agreement between the pharmacist and referred patient. This is in lieu of paper consent due to COVID-19 precautions and the use of remote/virtual visits.        For Pharmacy Admin Tracking Only    Intervention Detail:   Total # of Interventions Recommended: 0  Total # of Interventions Accepted: 0  Time Spent (min): 15

## 2023-05-03 ENCOUNTER — HOSPITAL ENCOUNTER (OUTPATIENT)
Dept: PHARMACY | Age: 63
Setting detail: THERAPIES SERIES
Discharge: HOME | End: 2023-05-03
Payer: COMMERCIAL

## 2023-05-03 DIAGNOSIS — I82.401 DEEP VEIN THROMBOSIS (DVT) OF RIGHT LOWER EXTREMITY, UNSPECIFIED CHRONICITY, UNSPECIFIED VEIN (HCC): ICD-10-CM

## 2023-05-03 DIAGNOSIS — I26.99 OTHER PULMONARY EMBOLISM WITHOUT ACUTE COR PULMONALE, UNSPECIFIED CHRONICITY (HCC): Primary | ICD-10-CM

## 2023-05-03 LAB
INR BLD: 1.9
PROTIME: 23.2 SECONDS

## 2023-05-03 PROCEDURE — 85610 PROTHROMBIN TIME: CPT

## 2023-05-03 PROCEDURE — 99212 OFFICE O/P EST SF 10 MIN: CPT

## 2023-05-03 NOTE — PROGRESS NOTES
Patient seen in clinic for warfarin management due to DVT and PE with an INR goal of 2.0-3.0. Estimated duration of therapy is indefinite. Patient states compliant all of the time with regimen. No bleeding or thromboembolic side effects noted. No significant med changes. Patient states he and his wife have been eating more salads and veggies lately in effort to lose some weight. He states it has been consistent over the last several weeks. No significant recent illness or disease state changes. PT/INR done in office per protocol. INR is 1.9 which is just below goal likely from increased vitamin K. Warfarin regimen will be increased to 10mg Tuesday only, and 15mg all other days as patient anticipates current dietary intake to remain the same. Will retest in 4 weeks. Patient understands dosing directions and information discussed. Dosing schedule and follow up appointment given to patient. Progress note routed to referring physicians office. Discussed with patient the Pharmacist Collaborative Practice Agreement. Patient provided verbal and/or electronic (ex. [employer]) consent to participate in the collaborative practice agreement between the pharmacist and referred patient. This is in lieu of paper consent due to COVID-19 precautions and the use of remote/virtual visits.        For Pharmacy Admin Tracking Only    Intervention Detail: Dose Adjustment: 1, reason: Therapy Optimization  Total # of Interventions Recommended: 1  Total # of Interventions Accepted: 1  Time Spent (min): 15

## 2023-05-31 ENCOUNTER — HOSPITAL ENCOUNTER (OUTPATIENT)
Dept: PHARMACY | Age: 63
Setting detail: THERAPIES SERIES
Discharge: HOME | End: 2023-05-31
Payer: COMMERCIAL

## 2023-05-31 DIAGNOSIS — I82.401 DEEP VEIN THROMBOSIS (DVT) OF RIGHT LOWER EXTREMITY, UNSPECIFIED CHRONICITY, UNSPECIFIED VEIN (HCC): ICD-10-CM

## 2023-05-31 DIAGNOSIS — I26.99 OTHER PULMONARY EMBOLISM WITHOUT ACUTE COR PULMONALE, UNSPECIFIED CHRONICITY (HCC): Primary | ICD-10-CM

## 2023-05-31 LAB
INR BLD: 3.6
PROTIME: 43.5 SECONDS

## 2023-05-31 PROCEDURE — 85610 PROTHROMBIN TIME: CPT

## 2023-05-31 PROCEDURE — 99212 OFFICE O/P EST SF 10 MIN: CPT

## 2023-05-31 NOTE — PROGRESS NOTES
Patient seen in clinic for warfarin management due to DVT and PE with an INR goal of 2.0-3.0. Estimated duration of therapy is indefinite. Patient states compliant all of the time with regimen. No bleeding or thromboembolic side effects noted. No significant med changes. Patient states he has been eating more veggies as noted at last appt. No significant recent illness or disease state changes. PT/INR done in office per protocol. INR is 3.6 which is supratherapeutic. Warfarin regimen will be decreased to 10mg tues/fri, and 15mg all other days   Will retest in 2 weeks. Patient understands dosing directions and information discussed. Dosing schedule and follow up appointment given to patient. Progress note routed to referring physicians office. Discussed with patient the Pharmacist Collaborative Practice Agreement. Patient provided verbal and/or electronic (ex. [employer]) consent to participate in the collaborative practice agreement between the pharmacist and referred patient. This is in lieu of paper consent due to COVID-19 precautions and the use of remote/virtual visits.        For Pharmacy Admin Tracking Only    Intervention Detail: Dose Adjustment: 1, reason: Therapy De-escalation  Total # of Interventions Recommended: 1  Total # of Interventions Accepted: 1  Time Spent (min): 15

## 2023-06-14 ENCOUNTER — APPOINTMENT (OUTPATIENT)
Dept: PHARMACY | Age: 63
End: 2023-06-14
Payer: COMMERCIAL

## 2023-06-29 ENCOUNTER — HOSPITAL ENCOUNTER (OUTPATIENT)
Dept: PHARMACY | Age: 63
Setting detail: THERAPIES SERIES
Discharge: HOME | End: 2023-06-29
Payer: COMMERCIAL

## 2023-06-29 DIAGNOSIS — I26.99 OTHER PULMONARY EMBOLISM WITHOUT ACUTE COR PULMONALE, UNSPECIFIED CHRONICITY (HCC): Primary | ICD-10-CM

## 2023-06-29 DIAGNOSIS — I82.401 DEEP VEIN THROMBOSIS (DVT) OF RIGHT LOWER EXTREMITY, UNSPECIFIED CHRONICITY, UNSPECIFIED VEIN (HCC): ICD-10-CM

## 2023-06-29 LAB
INR BLD: 2.7
PROTIME: 32.4 SECONDS

## 2023-06-29 PROCEDURE — 99211 OFF/OP EST MAY X REQ PHY/QHP: CPT

## 2023-06-29 PROCEDURE — 85610 PROTHROMBIN TIME: CPT

## 2023-07-27 ENCOUNTER — HOSPITAL ENCOUNTER (OUTPATIENT)
Dept: PHARMACY | Age: 63
Setting detail: THERAPIES SERIES
Discharge: HOME | End: 2023-07-27
Payer: COMMERCIAL

## 2023-07-27 DIAGNOSIS — I82.401 DEEP VEIN THROMBOSIS (DVT) OF RIGHT LOWER EXTREMITY, UNSPECIFIED CHRONICITY, UNSPECIFIED VEIN (HCC): ICD-10-CM

## 2023-07-27 DIAGNOSIS — I26.99 OTHER PULMONARY EMBOLISM WITHOUT ACUTE COR PULMONALE, UNSPECIFIED CHRONICITY (HCC): Primary | ICD-10-CM

## 2023-07-27 LAB
INR BLD: 2.8
PROTIME: 33 SECONDS

## 2023-07-27 PROCEDURE — 85610 PROTHROMBIN TIME: CPT

## 2023-07-27 PROCEDURE — 99211 OFF/OP EST MAY X REQ PHY/QHP: CPT

## 2023-07-27 NOTE — PROGRESS NOTES
Patient seen in clinic for warfarin management due to DVT and PE with an INR goal of 2.0-3.0. Estimated duration of therapy is indefinite. Patient states compliant all of the time with regimen. No bleeding or thromboembolic side effects noted. No significant med or dietary changes. No significant recent illness or disease state changes. PT/INR done in office per protocol. INR is 2.8 which is therapeutic. Warfarin regimen will be continued at current dose 10 mg Tue/Fri and 15 mg all other days. Will retest in 4 weeks. Patient understands dosing directions and information discussed. Dosing schedule and follow up appointment given to patient. Progress note routed to referring physicians office. Discussed with patient the Pharmacist Collaborative Practice Agreement. Patient provided verbal and/or electronic (ex. [employer]) consent to participate in the collaborative practice agreement between the pharmacist and referred patient. This is in lieu of paper consent due to COVID-19 precautions and the use of remote/virtual visits.        For Pharmacy Admin Tracking Only    Intervention Detail:   Total # of Interventions Recommended: 0  Total # of Interventions Accepted: 0  Time Spent (min): 15

## 2023-08-24 ENCOUNTER — HOSPITAL ENCOUNTER (OUTPATIENT)
Dept: PHARMACY | Age: 63
Setting detail: THERAPIES SERIES
Discharge: HOME | End: 2023-08-24
Payer: COMMERCIAL

## 2023-08-24 DIAGNOSIS — I82.401 DEEP VEIN THROMBOSIS (DVT) OF RIGHT LOWER EXTREMITY, UNSPECIFIED CHRONICITY, UNSPECIFIED VEIN (HCC): ICD-10-CM

## 2023-08-24 DIAGNOSIS — I26.99 OTHER PULMONARY EMBOLISM WITHOUT ACUTE COR PULMONALE, UNSPECIFIED CHRONICITY (HCC): Primary | ICD-10-CM

## 2023-08-24 LAB
INR BLD: 1.9
PROTIME: 22.5 SECONDS

## 2023-08-24 PROCEDURE — 85610 PROTHROMBIN TIME: CPT

## 2023-08-24 PROCEDURE — 99211 OFF/OP EST MAY X REQ PHY/QHP: CPT

## 2023-08-24 NOTE — PROGRESS NOTES
Patient seen in clinic for warfarin management due to DVT and PE with an INR goal of 2.0-3.0. Estimated duration of therapy is indefinite. Patient states  he missed his dose on Saturday 8/12 . No bleeding or thromboembolic side effects noted. No significant med or dietary changes. No significant recent illness or disease state changes. PT/INR done in office per protocol. INR is 1.9 which is just below goal.     Warfarin regimen will be continued at current dose of 10mg Mon/Fri and 15mg all other days. Will retest in 4 weeks. Patient understands dosing directions and information discussed. Dosing schedule and follow up appointment given to patient. Progress note routed to referring physicians office. Discussed with patient the Pharmacist Collaborative Practice Agreement. Patient provided verbal and/or electronic (ex. [employer]) consent to participate in the collaborative practice agreement between the pharmacist and referred patient. This is in lieu of paper consent due to COVID-19 precautions and the use of remote/virtual visits.        For Pharmacy Admin Tracking Only    Intervention Detail:   Total # of Interventions Recommended: 0  Total # of Interventions Accepted: 0  Time Spent (min): 15

## 2023-09-20 ENCOUNTER — HOSPITAL ENCOUNTER (OUTPATIENT)
Dept: PHARMACY | Age: 63
Setting detail: THERAPIES SERIES
Discharge: HOME | End: 2023-09-20
Payer: COMMERCIAL

## 2023-09-20 DIAGNOSIS — I82.401 DEEP VEIN THROMBOSIS (DVT) OF RIGHT LOWER EXTREMITY, UNSPECIFIED CHRONICITY, UNSPECIFIED VEIN (HCC): ICD-10-CM

## 2023-09-20 DIAGNOSIS — I26.99 OTHER PULMONARY EMBOLISM WITHOUT ACUTE COR PULMONALE, UNSPECIFIED CHRONICITY (HCC): Primary | ICD-10-CM

## 2023-09-20 LAB
INR BLD: 2.1
PROTIME: 25.1 SECONDS

## 2023-09-20 PROCEDURE — 85610 PROTHROMBIN TIME: CPT

## 2023-09-20 PROCEDURE — 99211 OFF/OP EST MAY X REQ PHY/QHP: CPT

## 2023-09-20 NOTE — PROGRESS NOTES
Patient seen in clinic for warfarin management due to DVT and PE with an INR goal of 2.0-3.0. Estimated duration of therapy is indefinite. Patient states compliant all of the time with regimen. No bleeding or thromboembolic side effects noted. No significant med or dietary changes. No significant recent illness or disease state changes. PT/INR done in office per protocol. INR is 2.1 which is therapeutic. Warfarin regimen will be continued at current dose 10mg Tues/Fri, 15mg all other days. Will retest in 4 weeks. Patient understands dosing directions and information discussed. Dosing schedule and follow up appointment given to patient. Progress note routed to referring physicians office. Discussed with patient the Pharmacist Collaborative Practice Agreement. Patient provided verbal and/or electronic (ex. [employer]) consent to participate in the collaborative practice agreement between the pharmacist and referred patient. This is in lieu of paper consent due to COVID-19 precautions and the use of remote/virtual visits.        For Pharmacy Admin Tracking Only    Intervention Detail:   Total # of Interventions Recommended: 0  Total # of Interventions Accepted: 0  Time Spent (min): 15

## 2023-10-03 ENCOUNTER — HOSPITAL ENCOUNTER (OUTPATIENT)
Age: 63
Discharge: HOME | End: 2023-10-03
Payer: COMMERCIAL

## 2023-10-03 LAB
ANION GAP SERPL CALCULATED.3IONS-SCNC: 8 MMOL/L (ref 9–17)
BILIRUB UR QL STRIP: NEGATIVE
BUN SERPL-MCNC: 18 MG/DL (ref 8–23)
CALCIUM SERPL-MCNC: 9 MG/DL (ref 8.6–10.4)
CASTS #/AREA URNS LPF: NORMAL /LPF (ref 0–8)
CHLORIDE SERPL-SCNC: 103 MMOL/L (ref 98–107)
CHOLEST SERPL-MCNC: 179 MG/DL
CHOLESTEROL/HDL RATIO: 4.4
CLARITY UR: CLEAR
CO2 SERPL-SCNC: 29 MMOL/L (ref 20–31)
COLOR UR: YELLOW
CREAT SERPL-MCNC: 0.9 MG/DL (ref 0.7–1.2)
CREAT UR-MCNC: 233.3 MG/DL (ref 39–259)
EPI CELLS #/AREA URNS HPF: NORMAL /HPF (ref 0–5)
GFR SERPL CREATININE-BSD FRML MDRD: >60 ML/MIN/1.73M2
GLUCOSE SERPL-MCNC: 76 MG/DL (ref 70–99)
GLUCOSE UR STRIP-MCNC: NEGATIVE MG/DL
HDLC SERPL-MCNC: 41 MG/DL
HGB UR QL STRIP.AUTO: NEGATIVE
KETONES UR STRIP-MCNC: NEGATIVE MG/DL
LDLC SERPL CALC-MCNC: 106 MG/DL (ref 0–130)
LEUKOCYTE ESTERASE UR QL STRIP: NEGATIVE
NITRITE UR QL STRIP: NEGATIVE
PH UR STRIP: 6 [PH] (ref 5–8)
POTASSIUM SERPL-SCNC: 4 MMOL/L (ref 3.7–5.3)
PROT UR STRIP-MCNC: ABNORMAL MG/DL
RBC #/AREA URNS HPF: NORMAL /HPF (ref 0–4)
SODIUM SERPL-SCNC: 140 MMOL/L (ref 135–144)
SP GR UR STRIP: 1.03 (ref 1–1.03)
TOTAL PROTEIN, URINE: 23 MG/DL
TRIGL SERPL-MCNC: 161 MG/DL
URINE TOTAL PROTEIN CREATININE RATIO: 0.1 (ref 0–0.2)
UROBILINOGEN UR STRIP-ACNC: NORMAL EU/DL (ref 0–1)
WBC #/AREA URNS HPF: NORMAL /HPF (ref 0–5)

## 2023-10-03 PROCEDURE — 80061 LIPID PANEL: CPT

## 2023-10-03 PROCEDURE — 84156 ASSAY OF PROTEIN URINE: CPT

## 2023-10-03 PROCEDURE — 36415 COLL VENOUS BLD VENIPUNCTURE: CPT

## 2023-10-03 PROCEDURE — 80048 BASIC METABOLIC PNL TOTAL CA: CPT

## 2023-10-03 PROCEDURE — 81001 URINALYSIS AUTO W/SCOPE: CPT

## 2023-10-03 PROCEDURE — 82570 ASSAY OF URINE CREATININE: CPT

## 2024-01-09 ENCOUNTER — HOSPITAL ENCOUNTER (OUTPATIENT)
Dept: PHARMACY | Age: 64
Setting detail: THERAPIES SERIES
Discharge: HOME | End: 2024-01-09
Payer: COMMERCIAL

## 2024-01-09 DIAGNOSIS — I82.401 DEEP VEIN THROMBOSIS (DVT) OF RIGHT LOWER EXTREMITY, UNSPECIFIED CHRONICITY, UNSPECIFIED VEIN (HCC): ICD-10-CM

## 2024-01-09 DIAGNOSIS — I26.99 OTHER PULMONARY EMBOLISM WITHOUT ACUTE COR PULMONALE, UNSPECIFIED CHRONICITY (HCC): Primary | ICD-10-CM

## 2024-01-09 LAB
INR BLD: 2.5
PROTIME: 30.6 SECONDS

## 2024-01-09 PROCEDURE — 99211 OFF/OP EST MAY X REQ PHY/QHP: CPT

## 2024-01-09 PROCEDURE — 85610 PROTHROMBIN TIME: CPT

## 2024-01-09 NOTE — PROGRESS NOTES
Patient seen in clinic for warfarin management due to DVT and PE with an INR goal of 2.0-3.0.  Estimated duration of therapy is indefinite.     Patient states compliant all of the time with regimen.  No bleeding or thromboembolic side effects noted.  No significant med or dietary changes.  No significant recent illness or disease state changes.      PT/INR done in office per protocol.  INR is 2.5 which is therapeutic.     Warfarin regimen will be continued at current dose 10mg Tues/Fri, 15mg all other days.  Will retest in 4 weeks.    Patient understands dosing directions and information discussed. Dosing schedule and follow up appointment given to patient.   Progress note routed to referring physicians office. Discussed with patient the Pharmacist Collaborative Practice Agreement.  Patient provided verbal and/or electronic (ex. [employer]) consent to participate in the collaborative practice agreement between the pharmacist and referred patient. This is in lieu of paper consent due to COVID-19 precautions and the use of remote/virtual visits.       For Pharmacy Admin Tracking Only    Intervention Detail:   Total # of Interventions Recommended: 0  Total # of Interventions Accepted: 0  Time Spent (min): 15

## 2024-02-06 ENCOUNTER — HOSPITAL ENCOUNTER (OUTPATIENT)
Dept: PHARMACY | Age: 64
Setting detail: THERAPIES SERIES
Discharge: HOME | End: 2024-02-06
Payer: COMMERCIAL

## 2024-02-06 DIAGNOSIS — I82.401 DEEP VEIN THROMBOSIS (DVT) OF RIGHT LOWER EXTREMITY, UNSPECIFIED CHRONICITY, UNSPECIFIED VEIN (HCC): ICD-10-CM

## 2024-02-06 DIAGNOSIS — I26.99 OTHER PULMONARY EMBOLISM WITHOUT ACUTE COR PULMONALE, UNSPECIFIED CHRONICITY (HCC): Primary | ICD-10-CM

## 2024-02-06 LAB
INR BLD: 2.1
PROTIME: 25 SECONDS

## 2024-02-06 PROCEDURE — 85610 PROTHROMBIN TIME: CPT

## 2024-02-06 PROCEDURE — 99211 OFF/OP EST MAY X REQ PHY/QHP: CPT

## 2024-02-06 NOTE — PROGRESS NOTES
Patient seen in clinic for warfarin management due to DVT and PE with an INR goal of 2.0-3.0.  Estimated duration of therapy is indefinite.     Patient states compliant all of the time with regimen.  No bleeding or thromboembolic side effects noted.  No significant med or dietary changes.  No significant recent illness or disease state changes.      PT/INR done in office per protocol.  INR is 2.1 which is therapeutic.     Warfarin regimen will be continued at current dose 10mg Tues/Fri, 15mg all other days.  Will retest in 5 weeks.    Patient understands dosing directions and information discussed. Dosing schedule and follow up appointment given to patient.   Progress note routed to referring physicians office. Discussed with patient the Pharmacist Collaborative Practice Agreement.  Patient provided verbal and/or electronic (ex. [employer]) consent to participate in the collaborative practice agreement between the pharmacist and referred patient.     For Pharmacy Admin Tracking Only    Intervention Detail:   Total # of Interventions Recommended: 0  Total # of Interventions Accepted: 0  Time Spent (min): 15

## 2024-03-12 ENCOUNTER — HOSPITAL ENCOUNTER (OUTPATIENT)
Dept: PHARMACY | Age: 64
Setting detail: THERAPIES SERIES
Discharge: HOME | End: 2024-03-12
Payer: COMMERCIAL

## 2024-03-12 DIAGNOSIS — I26.99 OTHER PULMONARY EMBOLISM WITHOUT ACUTE COR PULMONALE, UNSPECIFIED CHRONICITY (HCC): Primary | ICD-10-CM

## 2024-03-12 DIAGNOSIS — I82.401 DEEP VEIN THROMBOSIS (DVT) OF RIGHT LOWER EXTREMITY, UNSPECIFIED CHRONICITY, UNSPECIFIED VEIN (HCC): ICD-10-CM

## 2024-03-12 LAB
INR BLD: 2.1
PROTIME: 24.7 SECONDS

## 2024-03-12 PROCEDURE — 85610 PROTHROMBIN TIME: CPT

## 2024-03-12 PROCEDURE — 99211 OFF/OP EST MAY X REQ PHY/QHP: CPT

## 2024-03-12 NOTE — PROGRESS NOTES
Patient seen in clinic for warfarin management due to DVT and PE with an INR goal of 2.0-3.0.  Estimated duration of therapy is indefinite.     Patient states compliant all of the time with regimen.  No bleeding or thromboembolic side effects noted.  No significant med changes. Patient reports incorporating more salads with regular lettuce into his diet recently.  No significant recent illness or disease state changes.      PT/INR done in office per protocol.  INR is 2.1 which is therapeutic.     Warfarin regimen will be continued at current dose 10 mg every Tues, Fri; 15 mg all other days.  Will retest in 5 weeks.    Patient understands dosing directions and information discussed. Dosing schedule and follow up appointment given to patient.   Progress note routed to referring physicians office. Discussed with patient the Pharmacist Collaborative Practice Agreement.  Patient provided verbal and/or electronic (ex. [employer]) consent to participate in the collaborative practice agreement between the pharmacist and referred patient.     For Pharmacy Admin Tracking Only    Intervention Detail:   Total # of Interventions Recommended: 0  Total # of Interventions Accepted: 0  Time Spent (min): 15

## 2024-04-16 ENCOUNTER — APPOINTMENT (OUTPATIENT)
Dept: PHARMACY | Age: 64
End: 2024-04-16
Payer: COMMERCIAL

## 2024-04-17 ENCOUNTER — HOSPITAL ENCOUNTER (OUTPATIENT)
Dept: PHARMACY | Age: 64
Setting detail: THERAPIES SERIES
Discharge: HOME | End: 2024-04-17
Payer: COMMERCIAL

## 2024-04-17 LAB
INR BLD: 2
PROTIME: 24.3 SECONDS

## 2024-04-17 PROCEDURE — 85610 PROTHROMBIN TIME: CPT

## 2024-04-17 PROCEDURE — 99211 OFF/OP EST MAY X REQ PHY/QHP: CPT

## 2024-04-17 NOTE — PROGRESS NOTES
Patient seen in clinic for warfarin management due to DVT and PE with an INR goal of 2.0-3.0.  Estimated duration of therapy is indefinite.     Patient states compliant all of the time with regimen.  No bleeding or thromboembolic side effects noted.  No significant med or dietary changes.  No significant recent illness or disease state changes.      PT/INR done in office per protocol.  INR is 2.0 which is therapeutic.     Warfarin regimen will be continued at current dose 10mg every Tue, Fri; 15mg all other days.  Will retest in 5 weeks.    Patient understands dosing directions and information discussed. Dosing schedule and follow up appointment given to patient.   Progress note routed to referring physicians office. Discussed with patient the Pharmacist Collaborative Practice Agreement.  Patient provided verbal and/or electronic (ex. [employer]) consent to participate in the collaborative practice agreement between the pharmacist and referred patient.     For Pharmacy Admin Tracking Only    Intervention Detail:   Total # of Interventions Recommended: 0  Total # of Interventions Accepted: 0  Time Spent (min): 15

## 2024-05-21 ENCOUNTER — HOSPITAL ENCOUNTER (OUTPATIENT)
Dept: PHARMACY | Age: 64
Setting detail: THERAPIES SERIES
Discharge: HOME | End: 2024-05-21
Payer: COMMERCIAL

## 2024-05-21 DIAGNOSIS — I26.99 OTHER PULMONARY EMBOLISM WITHOUT ACUTE COR PULMONALE, UNSPECIFIED CHRONICITY (HCC): Primary | ICD-10-CM

## 2024-05-21 DIAGNOSIS — I82.401 DEEP VEIN THROMBOSIS (DVT) OF RIGHT LOWER EXTREMITY, UNSPECIFIED CHRONICITY, UNSPECIFIED VEIN (HCC): ICD-10-CM

## 2024-05-21 LAB
INR BLD: 2.5
PROTIME: 30.5 SECONDS

## 2024-05-21 PROCEDURE — 85610 PROTHROMBIN TIME: CPT | Performed by: PHARMACIST

## 2024-05-21 PROCEDURE — 99211 OFF/OP EST MAY X REQ PHY/QHP: CPT | Performed by: PHARMACIST

## 2024-05-21 NOTE — PROGRESS NOTES
Patient seen in clinic for warfarin management due to DVT and PE with an INR goal of 2.0-3.0.  Estimated duration of therapy is indefinite.     Patient states compliant all of the time with regimen.  No bleeding or thromboembolic side effects noted.  No significant med or dietary changes.  No significant recent illness or disease state changes.      PT/INR done in office per protocol.  INR is 2.5 which is therapeutic.     Warfarin regimen will be continued at current dose 10mg Tues/Fri, 15mg all other days.  Will retest in 5 weeks.    Patient understands dosing directions and information discussed. Dosing schedule and follow up appointment given to patient.   Progress note routed to referring physicians office. Discussed with patient the Pharmacist Collaborative Practice Agreement.  Patient provided verbal and/or electronic (ex. [employer]) consent to participate in the collaborative practice agreement between the pharmacist and referred patient.     For Pharmacy Admin Tracking Only    Intervention Detail:   Total # of Interventions Recommended: 0  Total # of Interventions Accepted: 0  Time Spent (min): 15

## 2024-05-22 ENCOUNTER — APPOINTMENT (OUTPATIENT)
Dept: PHARMACY | Age: 64
End: 2024-05-22
Payer: COMMERCIAL

## 2024-06-26 ENCOUNTER — HOSPITAL ENCOUNTER (OUTPATIENT)
Dept: PHARMACY | Age: 64
Setting detail: THERAPIES SERIES
Discharge: HOME | End: 2024-06-26
Payer: COMMERCIAL

## 2024-06-26 LAB
INR BLD: 1.9
PROTIME: 22.4 SECONDS

## 2024-06-26 PROCEDURE — 99212 OFFICE O/P EST SF 10 MIN: CPT

## 2024-06-26 PROCEDURE — 85610 PROTHROMBIN TIME: CPT

## 2024-06-26 NOTE — PROGRESS NOTES
Patient seen in clinic for warfarin management due to DVT and PE with an INR goal of 2.0-3.0.  Estimated duration of therapy is indefinite.     Patient states  he missed yesterday's dose .  No bleeding or thromboembolic side effects noted.  No significant med or dietary changes.  No significant recent illness or disease state changes.      PT/INR done in office per protocol.  INR is 1.9 which is just below goal, likely from the missed dose.    Warfarin regimen will be boosted to 15mg on Friday, then resume current dose 10mg every Tue, Fri; 15mg all other days.  Will retest in 5 weeks.    Patient understands dosing directions and information discussed. Dosing schedule and follow up appointment given to patient.   Progress note routed to referring physicians office. Discussed with patient the Pharmacist Collaborative Practice Agreement.  Patient provided verbal and/or electronic (ex. [employer]) consent to participate in the collaborative practice agreement between the pharmacist and referred patient.     For Pharmacy Admin Tracking Only    Intervention Detail: Dose Adjustment: 1, reason: Therapy Optimization  Total # of Interventions Recommended: 1  Total # of Interventions Accepted: 1  Time Spent (min): 15

## 2024-08-01 ENCOUNTER — ANTI-COAG VISIT (OUTPATIENT)
Age: 64
End: 2024-08-01
Payer: COMMERCIAL

## 2024-08-01 DIAGNOSIS — I26.99 OTHER PULMONARY EMBOLISM WITHOUT ACUTE COR PULMONALE, UNSPECIFIED CHRONICITY (HCC): Primary | ICD-10-CM

## 2024-08-01 DIAGNOSIS — I82.401 DEEP VEIN THROMBOSIS (DVT) OF RIGHT LOWER EXTREMITY, UNSPECIFIED CHRONICITY, UNSPECIFIED VEIN (HCC): ICD-10-CM

## 2024-08-01 LAB
INR BLD: 2.3
PROTIME: 28 SECONDS

## 2024-08-01 PROCEDURE — 85610 PROTHROMBIN TIME: CPT | Performed by: PHARMACIST

## 2024-08-01 PROCEDURE — 99211 OFF/OP EST MAY X REQ PHY/QHP: CPT | Performed by: PHARMACIST

## 2024-08-01 NOTE — PROGRESS NOTES
Patient seen in clinic for warfarin management due to DVT and PE with an INR goal of 2.0-3.0.  Estimated duration of therapy is indefinite.     Patient states compliant all of the time with regimen.  No bleeding or thromboembolic side effects noted.  No significant med or dietary changes.  No significant recent illness or disease state changes.      PT/INR done in office per protocol.  INR is 2.3 which is therapeutic.     Warfarin regimen will be continued at current dose of 10mg Tues/Fri and 15mg all other days.  Will retest in 5 weeks.    Patient understands dosing directions and information discussed. Dosing schedule and follow up appointment given to patient.   Progress note routed to referring physicians office. Discussed with patient the Pharmacist Collaborative Practice Agreement.  Patient provided verbal and/or electronic (ex. [employer]) consent to participate in the collaborative practice agreement between the pharmacist and referred patient.     For Pharmacy Admin Tracking Only    Intervention Detail:   Total # of Interventions Recommended: 0  Total # of Interventions Accepted: 0  Time Spent (min): 15

## 2024-09-05 ENCOUNTER — ANTI-COAG VISIT (OUTPATIENT)
Age: 64
End: 2024-09-05

## 2024-09-05 ENCOUNTER — HOSPITAL ENCOUNTER (OUTPATIENT)
Age: 64
Setting detail: SPECIMEN
Discharge: HOME | End: 2024-09-05
Payer: COMMERCIAL

## 2024-09-05 DIAGNOSIS — I82.401 DEEP VEIN THROMBOSIS (DVT) OF RIGHT LOWER EXTREMITY, UNSPECIFIED CHRONICITY, UNSPECIFIED VEIN (HCC): ICD-10-CM

## 2024-09-05 DIAGNOSIS — I26.99 OTHER PULMONARY EMBOLISM WITHOUT ACUTE COR PULMONALE, UNSPECIFIED CHRONICITY (HCC): Primary | ICD-10-CM

## 2024-09-05 DIAGNOSIS — I82.401 DEEP VEIN THROMBOSIS (DVT) OF RIGHT LOWER EXTREMITY, UNSPECIFIED CHRONICITY, UNSPECIFIED VEIN (HCC): Primary | ICD-10-CM

## 2024-09-05 LAB
INR PPP: 1.4
PROTHROMBIN TIME: 17.2 SEC (ref 11.5–14.2)

## 2024-09-05 PROCEDURE — 85610 PROTHROMBIN TIME: CPT

## 2024-09-05 PROCEDURE — 36415 COLL VENOUS BLD VENIPUNCTURE: CPT

## 2024-09-05 NOTE — PROGRESS NOTES
Attempted to reach patient via phone but had to LVM.      PT/INR done in [facility] outpatient lab.  INR is 1.4 which is subtherapeutic.     Warfarin regimen will be increased to 20mg x 1 tomorrow, then resume usual dose of 10mg Tue/Fri and 15mg all other days.  Will have next INR drawn in 2 weeks.    Patient given dosing directions and follow up appointment via voicemail.  Progress note routed to referring physicians office.    For Pharmacy Admin Tracking Only    Intervention Detail: Dose Adjustment: 1, reason: Therapy Optimization  Total # of Interventions Recommended: 1  Total # of Interventions Accepted: 1  Time Spent (min): 15

## 2024-09-19 ENCOUNTER — ANTI-COAG VISIT (OUTPATIENT)
Age: 64
End: 2024-09-19
Payer: COMMERCIAL

## 2024-09-19 DIAGNOSIS — I82.401 DEEP VEIN THROMBOSIS (DVT) OF RIGHT LOWER EXTREMITY, UNSPECIFIED CHRONICITY, UNSPECIFIED VEIN (HCC): ICD-10-CM

## 2024-09-19 DIAGNOSIS — I26.99 OTHER PULMONARY EMBOLISM WITHOUT ACUTE COR PULMONALE, UNSPECIFIED CHRONICITY (HCC): Primary | ICD-10-CM

## 2024-09-19 LAB
INTERNATIONAL NORMALIZATION RATIO, POC: 2.1
PROTHROMBIN TIME, POC: 25.1

## 2024-09-19 PROCEDURE — 99211 OFF/OP EST MAY X REQ PHY/QHP: CPT | Performed by: PHARMACIST

## 2024-09-19 PROCEDURE — 85610 PROTHROMBIN TIME: CPT | Performed by: PHARMACIST

## 2024-10-17 ENCOUNTER — ANTI-COAG VISIT (OUTPATIENT)
Age: 64
End: 2024-10-17
Payer: COMMERCIAL

## 2024-10-17 DIAGNOSIS — I82.401 DEEP VEIN THROMBOSIS (DVT) OF RIGHT LOWER EXTREMITY, UNSPECIFIED CHRONICITY, UNSPECIFIED VEIN (HCC): ICD-10-CM

## 2024-10-17 DIAGNOSIS — I26.99 OTHER PULMONARY EMBOLISM WITHOUT ACUTE COR PULMONALE, UNSPECIFIED CHRONICITY (HCC): Primary | ICD-10-CM

## 2024-10-17 LAB
INTERNATIONAL NORMALIZATION RATIO, POC: 2.1
PROTHROMBIN TIME, POC: 25.3

## 2024-10-17 PROCEDURE — 85610 PROTHROMBIN TIME: CPT | Performed by: PHARMACIST

## 2024-10-17 PROCEDURE — 99211 OFF/OP EST MAY X REQ PHY/QHP: CPT | Performed by: PHARMACIST

## 2024-10-17 RX ORDER — TADALAFIL 20 MG/1
20 TABLET ORAL PRN
COMMUNITY
Start: 2023-10-10

## 2024-10-17 NOTE — PROGRESS NOTES
Patient seen in clinic for warfarin management due to DVT and PE with an INR goal of 2.0-3.0.  Estimated duration of therapy is indefinite.     Patient states compliant all of the time with regimen.  No bleeding or thromboembolic side effects noted.  No significant med or dietary changes.  No significant recent illness or disease state changes.      PT/INR done in office per protocol.  INR is 2.1 which is therapeutic.     Warfarin regimen will be continued at current dose 10mg every Tue/Fri; 15mg all the other days.  Will retest in 5 weeks.    Patient understands dosing directions and information discussed. Dosing schedule and follow up appointment given to patient.   Progress note routed to referring physicians office. Discussed with patient the Pharmacist Collaborative Practice Agreement.  Patient provided verbal and/or electronic (ex. [employer]) consent to participate in the collaborative practice agreement between the pharmacist and referred patient.     For Pharmacy Admin Tracking Only    Intervention Detail:   Total # of Interventions Recommended: 0  Total # of Interventions Accepted: 0  Time Spent (min): 15

## 2024-11-21 ENCOUNTER — TELEPHONE (OUTPATIENT)
Age: 64
End: 2024-11-21

## 2024-11-21 NOTE — TELEPHONE ENCOUNTER
Patient was a no call/no show for INR appointment today.  Called and spoke with patient who states he is currently admitted at [facility] for elevation blood pressure and will be undergoing cardiac cath today.  Will reschedule upon discharge.

## 2024-12-19 ENCOUNTER — HOSPITAL ENCOUNTER (OUTPATIENT)
Age: 64
Setting detail: SPECIMEN
Discharge: HOME | End: 2024-12-19

## 2024-12-19 ENCOUNTER — TELEPHONE (OUTPATIENT)
Age: 64
End: 2024-12-19

## 2024-12-19 LAB
INR PPP: 1.4
PROTHROMBIN TIME: 16.8 SEC (ref 11.7–14.9)

## 2024-12-19 NOTE — TELEPHONE ENCOUNTER
Susan with [facility] called stating patient was recently discharged from the hospital and is under short term home care. Patient recently admitted for CABG x3 on 12/11/24. Developed post-op atrial fibrillation. Patient was already on warfarin for history of DVT/PE. He is currently bridging with Lovenox and taking 10mg warfarin daily. Patient has only 3 shots remaining onhand. I will send additional Lovenox to patients pharmacy to bridge until INR is therapeutic. Susan will get lab draw and send to clinic for further dosing instruction

## 2024-12-20 ENCOUNTER — ANTI-COAG VISIT (OUTPATIENT)
Age: 64
End: 2024-12-20

## 2024-12-20 DIAGNOSIS — I82.401 DEEP VEIN THROMBOSIS (DVT) OF RIGHT LOWER EXTREMITY, UNSPECIFIED CHRONICITY, UNSPECIFIED VEIN (HCC): ICD-10-CM

## 2024-12-20 DIAGNOSIS — I26.99 OTHER PULMONARY EMBOLISM WITHOUT ACUTE COR PULMONALE, UNSPECIFIED CHRONICITY (HCC): Primary | ICD-10-CM

## 2024-12-20 RX ORDER — POLYETHYLENE GLYCOL 3350 17 G/17G
17 POWDER, FOR SOLUTION ORAL DAILY PRN
COMMUNITY
Start: 2024-12-17

## 2024-12-20 RX ORDER — ENOXAPARIN SODIUM 100 MG/ML
INJECTION SUBCUTANEOUS
Qty: 10 EACH | Refills: 1 | OUTPATIENT
Start: 2024-12-20

## 2024-12-20 RX ORDER — PANTOPRAZOLE SODIUM 40 MG/1
40 TABLET, DELAYED RELEASE ORAL
COMMUNITY
Start: 2024-12-18

## 2024-12-20 RX ORDER — FUROSEMIDE 40 MG/1
40 TABLET ORAL DAILY
COMMUNITY
Start: 2024-12-17 | End: 2024-12-24

## 2024-12-20 RX ORDER — ASPIRIN 81 MG/1
81 TABLET ORAL DAILY
COMMUNITY
Start: 2024-11-26

## 2024-12-20 RX ORDER — ATORVASTATIN CALCIUM 40 MG/1
40 TABLET, FILM COATED ORAL DAILY
COMMUNITY
Start: 2024-11-25

## 2024-12-20 RX ORDER — ACETAMINOPHEN 325 MG/1
650 TABLET ORAL EVERY 4 HOURS PRN
COMMUNITY
Start: 2024-12-17

## 2024-12-20 RX ORDER — POTASSIUM CHLORIDE 1500 MG/1
20 TABLET, EXTENDED RELEASE ORAL DAILY
COMMUNITY
Start: 2024-12-17 | End: 2024-12-24

## 2024-12-20 RX ORDER — AMIODARONE HYDROCHLORIDE 200 MG/1
200 TABLET ORAL DAILY
COMMUNITY
Start: 2024-12-17

## 2024-12-20 RX ORDER — ENOXAPARIN SODIUM 100 MG/ML
100 INJECTION SUBCUTANEOUS 2 TIMES DAILY
COMMUNITY
Start: 2024-12-17 | End: 2024-12-20 | Stop reason: SDUPTHER

## 2024-12-20 RX ORDER — LISINOPRIL 20 MG/1
20 TABLET ORAL EVERY MORNING
COMMUNITY
Start: 2024-11-25

## 2024-12-20 RX ORDER — AMOXICILLIN 250 MG
1 CAPSULE ORAL 2 TIMES DAILY
COMMUNITY
Start: 2024-12-17 | End: 2024-12-31

## 2024-12-20 RX ORDER — TRAMADOL HYDROCHLORIDE 50 MG/1
50 TABLET ORAL EVERY 6 HOURS PRN
COMMUNITY

## 2024-12-20 RX ORDER — MUPIROCIN 20 MG/G
OINTMENT TOPICAL DAILY
COMMUNITY
Start: 2024-12-02

## 2024-12-20 RX ORDER — METOPROLOL TARTRATE 25 MG/1
25 TABLET, FILM COATED ORAL 2 TIMES DAILY
COMMUNITY
Start: 2024-12-02

## 2024-12-20 RX ORDER — OXYCODONE HYDROCHLORIDE 5 MG/1
5 TABLET ORAL EVERY 6 HOURS PRN
COMMUNITY
Start: 2024-12-17 | End: 2024-12-24

## 2024-12-20 NOTE — PROGRESS NOTES
INR results from home draw obtained from [facility]. Patient recently discharged following CABG x 3 and was started on Amiodarone. Will continue to monitor INR closely given this significant and delayed drug interaction.    PT/INR done in patient's home by home care provider.  INR is 1.4 which is subtherapeutic.     Warfarin regimen will be continued with 15mg Fri/Sat and 10mg Sunday along with Lovenox bridge.  Will have next INR drawn Monday 12/23/24.  Anticipate next draw to be 12/26/24    Home nurse given dosing directions and follow up appointment.  Progress note routed to referring physicians office.    For Pharmacy Admin Tracking Only    Intervention Detail: Dose Adjustment: 1, reason: Therapy Optimization and Refill(s) Provided  Total # of Interventions Recommended: 2  Total # of Interventions Accepted: 2  Time Spent (min): 15

## 2024-12-23 ENCOUNTER — HOSPITAL ENCOUNTER (OUTPATIENT)
Age: 64
Setting detail: SPECIMEN
Discharge: HOME | End: 2024-12-23
Payer: COMMERCIAL

## 2024-12-23 ENCOUNTER — ANTI-COAG VISIT (OUTPATIENT)
Age: 64
End: 2024-12-23

## 2024-12-23 ENCOUNTER — TELEPHONE (OUTPATIENT)
Age: 64
End: 2024-12-23

## 2024-12-23 DIAGNOSIS — I26.99 OTHER PULMONARY EMBOLISM WITHOUT ACUTE COR PULMONALE, UNSPECIFIED CHRONICITY (HCC): Primary | ICD-10-CM

## 2024-12-23 DIAGNOSIS — I82.401 DEEP VEIN THROMBOSIS (DVT) OF RIGHT LOWER EXTREMITY, UNSPECIFIED CHRONICITY, UNSPECIFIED VEIN (HCC): ICD-10-CM

## 2024-12-23 LAB
INR BLD: 2.5
INR PPP: 2.5
PROTHROMBIN TIME: 25.3 SEC (ref 9.4–12.6)
PROTIME: 25.3

## 2024-12-23 PROCEDURE — 85610 PROTHROMBIN TIME: CPT

## 2024-12-23 NOTE — TELEPHONE ENCOUNTER
LVM for instructions to stop Lovenox and continue warfarin dosing per my note. INR will be rechecked 12/26

## 2024-12-23 NOTE — PROGRESS NOTES
Home care providerSusan.  No bleeding or thromboembolic side effects noted.  No significant med or dietary changes.  No significant recent illness or disease state changes.      PT/INR done in patient's home by home care provider.  INR is 2.5 which is therapeutic. Stop Lovenox injections.    Warfarin regimen will be continued at current dose 10 mg Tues/Fri and 15 mg all other days.  Will have next INR drawn 12/26.    Home nurse given dosing directions and follow up appointment.  Progress note routed to referring physicians office.    For Pharmacy Admin Tracking Only    Intervention Detail:   Total # of Interventions Recommended: 0  Total # of Interventions Accepted: 0  Time Spent (min): 15

## 2024-12-23 NOTE — TELEPHONE ENCOUNTER
Talked with Susan about INR draw. Hold Lovenox and continue warfarin per note. Next INR draw 12/26

## 2024-12-26 ENCOUNTER — TELEPHONE (OUTPATIENT)
Age: 64
End: 2024-12-26

## 2024-12-26 ENCOUNTER — ANTI-COAG VISIT (OUTPATIENT)
Age: 64
End: 2024-12-26

## 2024-12-26 DIAGNOSIS — I26.99 OTHER PULMONARY EMBOLISM WITHOUT ACUTE COR PULMONALE, UNSPECIFIED CHRONICITY (HCC): Primary | ICD-10-CM

## 2024-12-26 DIAGNOSIS — I82.401 DEEP VEIN THROMBOSIS (DVT) OF RIGHT LOWER EXTREMITY, UNSPECIFIED CHRONICITY, UNSPECIFIED VEIN (HCC): ICD-10-CM

## 2024-12-26 NOTE — TELEPHONE ENCOUNTER
Spoke to Ricarda with [facility]. [facility] did not answer his door today and they were unable to complete the blood draw. She is rescheduling draw for 1/2 as his INR was therapeutic on 12/23.

## 2025-01-02 ENCOUNTER — TELEPHONE (OUTPATIENT)
Age: 65
End: 2025-01-02

## 2025-01-02 NOTE — TELEPHONE ENCOUNTER
Spoke to patient and he is currently in the hospital and was admitted on [facility]. He will call us to reschedule once he is out of hospital.

## 2025-01-10 ENCOUNTER — TELEPHONE (OUTPATIENT)
Age: 65
End: 2025-01-10

## 2025-01-10 DIAGNOSIS — I82.401 DEEP VEIN THROMBOSIS (DVT) OF RIGHT LOWER EXTREMITY, UNSPECIFIED CHRONICITY, UNSPECIFIED VEIN (HCC): ICD-10-CM

## 2025-01-10 DIAGNOSIS — I26.99 OTHER PULMONARY EMBOLISM WITHOUT ACUTE COR PULMONALE, UNSPECIFIED CHRONICITY (HCC): Primary | ICD-10-CM

## 2025-01-10 NOTE — TELEPHONE ENCOUNTER
Spoke with Robinson at [facility] who states patient will likely be discharging home today and will have temporary homecare with [facility]. He had CABG and PE back in November and has just suffered from acute DVT in both extremities. INR today was 2.2 with dosing history as follows:    12/24-1/1: Warfarin held  1/2: 5mg  1/3-5: 7.5mg  1/6-7: 10mg  1/8-9: 15mg

## 2025-01-13 ENCOUNTER — ANTI-COAG VISIT (OUTPATIENT)
Age: 65
End: 2025-01-13

## 2025-01-13 ENCOUNTER — HOSPITAL ENCOUNTER (OUTPATIENT)
Age: 65
Setting detail: SPECIMEN
Discharge: HOME | End: 2025-01-13
Payer: COMMERCIAL

## 2025-01-13 DIAGNOSIS — I82.401 DEEP VEIN THROMBOSIS (DVT) OF RIGHT LOWER EXTREMITY, UNSPECIFIED CHRONICITY, UNSPECIFIED VEIN (HCC): ICD-10-CM

## 2025-01-13 DIAGNOSIS — I26.99 OTHER PULMONARY EMBOLISM WITHOUT ACUTE COR PULMONALE, UNSPECIFIED CHRONICITY (HCC): Primary | ICD-10-CM

## 2025-01-13 LAB
INR PPP: 2.4
PROTHROMBIN TIME: 26 SEC (ref 11.5–14.2)

## 2025-01-13 PROCEDURE — 85610 PROTHROMBIN TIME: CPT

## 2025-01-13 RX ORDER — BUMETANIDE 1 MG/1
1 TABLET ORAL DAILY
COMMUNITY
Start: 2025-01-10

## 2025-01-13 RX ORDER — OXYCODONE HYDROCHLORIDE 5 MG/1
5 TABLET ORAL EVERY 4 HOURS PRN
COMMUNITY
Start: 2025-01-10

## 2025-01-13 RX ORDER — DOXYCYCLINE 100 MG/1
100 CAPSULE ORAL 2 TIMES DAILY
COMMUNITY
Start: 2025-01-10

## 2025-01-13 NOTE — PROGRESS NOTES
INR result found in EPIC. Called and spoke with patient on the states patient  has resumed usual home dose since 1/10/25 .  No thromboembolic side effects noted.  No significant dietary changes.  Medication list has been updated to reflect changes since hospitalization. He is currently taking 7 days course of Doxycycline 100mg BID which ends on 1/17/25.    PT/INR done in patient's home by home care provider.  INR is 2.4 which is therapeutic.     Warfarin regimen will be continued with 10mg Tuesday through Friday and 15mg Sat/Sun as we anticipate some elevation from Doxycycline therapy along with recent start of Amiodarone.  Will have next INR drawn in 1 week on Monday 1/20/25.    Home nurse given dosing directions and follow up appointment.  Progress note routed to referring physicians office.    For Pharmacy Admin Tracking Only    Intervention Detail: Dose Adjustment: 1, reason: Therapy De-escalation  Total # of Interventions Recommended: 1  Total # of Interventions Accepted: 1  Time Spent (min): 15

## 2025-01-20 ENCOUNTER — HOSPITAL ENCOUNTER (OUTPATIENT)
Age: 65
Setting detail: SPECIMEN
Discharge: HOME | End: 2025-01-20

## 2025-01-20 LAB
INR PPP: 3.1
PROTHROMBIN TIME: 30.9 SEC (ref 11.7–14.9)

## 2025-01-21 ENCOUNTER — ANTI-COAG VISIT (OUTPATIENT)
Age: 65
End: 2025-01-21

## 2025-01-21 DIAGNOSIS — I82.401 DEEP VEIN THROMBOSIS (DVT) OF RIGHT LOWER EXTREMITY, UNSPECIFIED CHRONICITY, UNSPECIFIED VEIN (HCC): ICD-10-CM

## 2025-01-21 DIAGNOSIS — I26.99 OTHER PULMONARY EMBOLISM WITHOUT ACUTE COR PULMONALE, UNSPECIFIED CHRONICITY (HCC): Primary | ICD-10-CM

## 2025-01-21 NOTE — PROGRESS NOTES
Home care provider, faxed yesterday's INR result. I called the patient today but had to leave a message with instructions; I requested he call back to discuss result and confirm no other changes we need to be aware of.    Per chart notes, he would have finished his course of Doxycyline on 1/17.     PT/INR done in patient's home by home care provider on Monday 1/20.  INR is 3.1 which is just above goal.     Warfarin regimen will be continued with 15mg Sat/Sun, 10mg all other days.  Will have next INR drawn in 1 week on 1/27/25.     Homecare agency given dosing directions and follow up appointment via fax.  Progress note routed to referring physicians office.    For Pharmacy Admin Tracking Only    Intervention Detail:   Total # of Interventions Recommended: 0  Total # of Interventions Accepted: 0  Time Spent (min): 15

## 2025-01-22 ENCOUNTER — TELEPHONE (OUTPATIENT)
Age: 65
End: 2025-01-22

## 2025-01-22 NOTE — TELEPHONE ENCOUNTER
Returned voicemail from patient about his warfarin regimen. Told him he should take 15mg Sat/Sun and 10mg all other days. Had patient repeat dose back to me to be sure he understood.   
I independently performed the key portions of the evaluation and management service provided. I agree with the above history, physical, and plan which I have reviewed and edited where appropriate. I find ESRD, possible syncope at HD. Continue HD. Neuro and cardiac eval.  See full note. (Patient seen earlier in day.)
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 hospital course complicated by unresponsive event in dialysis with bradycardia 30's/hypotension 90's, CT head 1/11/20 showed microangiopathic ischemic disease, lacunar infarcts in the basal ganglia bilaterally; no intracranial hemorrhage, acute transcortical infarct, or calvarial fracture. TTE 1/13/20 showed severe AS, ADOLFO 0.67, MG 38, Mild AR, Mild MR. Pt reports having remote hx approximately 1mo ago rt hip sx with continued pain and limited ambulation at rehab. exam remarkable for 3/6 SALAZAR base rad to carotids, cta bl, no cce with dimished LE pulses dopplerable, venous/aterial insufficiency changes, LUE fistula. imp: severe AS; unresponsive event unclear etiology but significant baseline AS; ESRD on HD; PAD. pt is high risk for SAVR/TAVR.   -consider for BAV, need collateral hx regarding functional status/remote hx and hospital adm  -neuro evaluation  -CTA TAVR protocol to evaluation anatomical considerations for intervention; include rt hip, consider ortho evaluation  -PT, nutrition  -continue to follow    Plan:  Problem 1: severe AS on TTE 1/13/20  -continue coreg 25mg q12  -continue atorvastatin 80mg hs  -keep euvolemic- ESRD on HD was hemodialyzed Mon -2.9L   (reg. sched is tue/thurs/sat)  -Dr. Mcfarlane/Dr. Gasca (D team) to see patient       Problem 2: severe Hypertension sbp 200's  -weaned off Nicardipine  -continue home meds: coreg 25mg q12, Nifedipine XL 60mg daily, Imdur 90mg daily, Hydralazine 100mg q8h, Losartan 100mg daily, clonidine 0.3mg q8h      Problem 3: IDDM  -HgA1C 6.3  -continue diet control  -continue managing BG with insulin sliding scale      Problem 4: left carotid stent  -carotid duplex showed No hemodynamically significant stenosis in bilateral CCA and ICA  -continue plavix and aspirin    I have reviewed clinical labs tests and reports, radiology tests and reports, as well as old patient medical records, and discussed with the referring physician.

## 2025-01-27 ENCOUNTER — ANTI-COAG VISIT (OUTPATIENT)
Age: 65
End: 2025-01-27

## 2025-01-27 ENCOUNTER — HOSPITAL ENCOUNTER (OUTPATIENT)
Age: 65
Setting detail: SPECIMEN
Discharge: HOME | End: 2025-01-27
Payer: COMMERCIAL

## 2025-01-27 DIAGNOSIS — I82.401 DEEP VEIN THROMBOSIS (DVT) OF RIGHT LOWER EXTREMITY, UNSPECIFIED CHRONICITY, UNSPECIFIED VEIN (HCC): ICD-10-CM

## 2025-01-27 DIAGNOSIS — I26.99 OTHER PULMONARY EMBOLISM WITHOUT ACUTE COR PULMONALE, UNSPECIFIED CHRONICITY (HCC): Primary | ICD-10-CM

## 2025-01-27 LAB
INR PPP: 3.1 (ref 0.9–1.2)
PROTHROMBIN TIME: 30.9 SEC (ref 11.8–14.6)

## 2025-01-27 PROCEDURE — 85610 PROTHROMBIN TIME: CPT

## 2025-01-27 NOTE — PROGRESS NOTES
INR result seen in EPIC. Called and spoke with patient who states he has been compliant with regimen. No bleeding or thromboembolic side effects noted.  No significant dietary changes.  Patient has discontinued Amiodarone as of today per cardiology. He is to remain off for 30 days then get monitor to wear for 30 days after. No significant recent illness or disease state changes.      PT/INR done in patient's home by home care provider.  INR is 3.1 which is just above goal.     Warfarin regimen will be continued at current dose target of 80mg. Will have patient take 15mg Sun/Thur and 10mg all other days.  Will have next INR drawn in 2 weeks in clinic.    Home nurse given dosing directions and follow up appointment.  Progress note routed to referring physicians office.    For Pharmacy Admin Tracking Only    Intervention Detail:   Total # of Interventions Recommended: 0  Total # of Interventions Accepted: 0  Time Spent (min): 15

## 2025-02-10 ENCOUNTER — ANTI-COAG VISIT (OUTPATIENT)
Age: 65
End: 2025-02-10
Payer: COMMERCIAL

## 2025-02-10 DIAGNOSIS — I26.99 OTHER PULMONARY EMBOLISM WITHOUT ACUTE COR PULMONALE, UNSPECIFIED CHRONICITY (HCC): Primary | ICD-10-CM

## 2025-02-10 DIAGNOSIS — I82.401 DEEP VEIN THROMBOSIS (DVT) OF RIGHT LOWER EXTREMITY, UNSPECIFIED CHRONICITY, UNSPECIFIED VEIN (HCC): ICD-10-CM

## 2025-02-10 LAB
INTERNATIONAL NORMALIZATION RATIO, POC: 3.1
PROTHROMBIN TIME, POC: 36.7

## 2025-02-10 PROCEDURE — 99211 OFF/OP EST MAY X REQ PHY/QHP: CPT | Performed by: PHARMACIST

## 2025-02-10 PROCEDURE — 85610 PROTHROMBIN TIME: CPT | Performed by: PHARMACIST

## 2025-02-10 RX ORDER — LOSARTAN POTASSIUM 25 MG/1
25 TABLET ORAL DAILY
COMMUNITY
Start: 2025-01-31

## 2025-02-10 NOTE — PROGRESS NOTES
Patient seen in clinic for warfarin management due to DVT and PE with an INR goal of 2.0-3.0.  Estimated duration of therapy is indefinite.     Patient states compliant all of the time with regimen.  No bleeding or thromboembolic side effects noted.  No significant med or dietary changes, but he does note having a decreased appetite for some foods recently. No significant recent illness or disease state changes.  He reports taking two 20 minute walks each day and will begin cardiac rehab on March 5th.    PT/INR done in office per protocol.  INR is 3.1 which is supratherapeutic.     Warfarin regimen will be continued at current dose 15mg on Sun/Thurs and 10mg the rest of the days. Patient advised to increase his protein intake to assist in wound-healing and managing INR.  Will retest in 3 weeks.    Patient understands dosing directions and information discussed. Dosing schedule and follow up appointment given to patient.   Progress note routed to referring physicians office. Discussed with patient the Pharmacist Collaborative Practice Agreement.  Patient provided verbal and/or electronic (ex. [employer]) consent to participate in the collaborative practice agreement between the pharmacist and referred patient.     For Pharmacy Admin Tracking Only    Intervention Detail:   Total # of Interventions Recommended: 0  Total # of Interventions Accepted: 0  Time Spent (min): 15

## 2025-02-17 RX ORDER — WARFARIN SODIUM 10 MG/1
TABLET ORAL
Qty: 30 TABLET | Refills: 0 | OUTPATIENT
Start: 2025-02-17

## 2025-02-20 RX ORDER — WARFARIN SODIUM 10 MG/1
15 TABLET ORAL DAILY
Qty: 135 TABLET | Refills: 1 | OUTPATIENT
Start: 2025-02-20

## 2025-02-24 ENCOUNTER — ANTI-COAG VISIT (OUTPATIENT)
Age: 65
End: 2025-02-24
Payer: COMMERCIAL

## 2025-02-24 DIAGNOSIS — I82.401 DEEP VEIN THROMBOSIS (DVT) OF RIGHT LOWER EXTREMITY, UNSPECIFIED CHRONICITY, UNSPECIFIED VEIN (HCC): ICD-10-CM

## 2025-02-24 DIAGNOSIS — I26.99 OTHER PULMONARY EMBOLISM WITHOUT ACUTE COR PULMONALE, UNSPECIFIED CHRONICITY (HCC): Primary | ICD-10-CM

## 2025-02-24 LAB
INR BLD: 2.8
PROTIME: 33.3

## 2025-02-24 PROCEDURE — 99211 OFF/OP EST MAY X REQ PHY/QHP: CPT

## 2025-02-24 PROCEDURE — 85610 PROTHROMBIN TIME: CPT

## 2025-02-24 NOTE — PROGRESS NOTES
Patient seen in clinic for warfarin management due to DVT with an INR goal of 2.0-3.0.  Estimated duration of therapy is indefinite.     Patient states compliant all of the time with regimen.  No bleeding or thromboembolic side effects noted.  No significant med or dietary changes.  He is having a skin graft procedure at [facility] today and reports they were unaware he is taking warfarin. He informed them and Kanchan advised to let the care team at [facility] know his INR today is 2.8. No other significant recent illness or disease state changes.      PT/INR done in office per protocol.  INR is 2.8 which is therapeutic.     Warfarin regimen will be continued at current dose 15mg on Sun/Thurs and 10mg daily the rest of the week.  Will retest in 3 weeks.    Patient understands dosing directions and information discussed. Dosing schedule and follow up appointment given to patient.   Progress note routed to referring physicians office. Discussed with patient the Pharmacist Collaborative Practice Agreement.  Patient provided verbal and/or electronic (ex. [employer]) consent to participate in the collaborative practice agreement between the pharmacist and referred patient.     For Pharmacy Admin Tracking Only    Intervention Detail:   Total # of Interventions Recommended: 1  Total # of Interventions Accepted: 1  Time Spent (min): 15

## 2025-02-27 ENCOUNTER — TELEPHONE (OUTPATIENT)
Age: 65
End: 2025-02-27

## 2025-02-27 DIAGNOSIS — I26.99 OTHER PULMONARY EMBOLISM WITHOUT ACUTE COR PULMONALE, UNSPECIFIED CHRONICITY (HCC): Primary | ICD-10-CM

## 2025-02-27 DIAGNOSIS — I82.401 DEEP VEIN THROMBOSIS (DVT) OF RIGHT LOWER EXTREMITY, UNSPECIFIED CHRONICITY, UNSPECIFIED VEIN (HCC): ICD-10-CM

## 2025-02-27 NOTE — TELEPHONE ENCOUNTER
Patient called to let us know he has been started on Doxycycline 100mg twice daily on 2/24 after his skin graft procedure. Advised patient hold warfarin this Friday 2/28 to offset the likely elevation in INR from the antibiotic. We will test INR 3/6/25 to ensure INR is stable.

## 2025-03-06 ENCOUNTER — ANTI-COAG VISIT (OUTPATIENT)
Age: 65
End: 2025-03-06
Payer: COMMERCIAL

## 2025-03-06 DIAGNOSIS — I82.401 DEEP VEIN THROMBOSIS (DVT) OF RIGHT LOWER EXTREMITY, UNSPECIFIED CHRONICITY, UNSPECIFIED VEIN (HCC): ICD-10-CM

## 2025-03-06 DIAGNOSIS — I26.99 OTHER PULMONARY EMBOLISM WITHOUT ACUTE COR PULMONALE, UNSPECIFIED CHRONICITY (HCC): Primary | ICD-10-CM

## 2025-03-06 LAB
INTERNATIONAL NORMALIZATION RATIO, POC: 2
PROTHROMBIN TIME, POC: 23.9

## 2025-03-06 PROCEDURE — 99211 OFF/OP EST MAY X REQ PHY/QHP: CPT | Performed by: PHARMACIST

## 2025-03-06 PROCEDURE — 85610 PROTHROMBIN TIME: CPT | Performed by: PHARMACIST

## 2025-03-06 NOTE — PROGRESS NOTES
Patient seen in clinic for warfarin management due to DVT and PE with an INR goal of 2.0-3.0.  Estimated duration of therapy is indefinite.     Patient states compliant all of the time with regimen.  No bleeding or thromboembolic side effects noted.  No significant med or dietary changes.  No significant recent illness or disease state changes.      PT/INR done in office per protocol.  INR is 2.0 which is therapeutic.     Warfarin regimen will be continued at current dose of 15mg Sun/Thur and 10mg all other days.  Will retest in 3 weeks.    Patient understands dosing directions and information discussed. Dosing schedule and follow up appointment given to patient.   Progress note routed to referring physicians office. Discussed with patient the Pharmacist Collaborative Practice Agreement.  Patient provided verbal and/or electronic (ex. [email or web address]) consent to participate in the collaborative practice agreement between the pharmacist and referred patient.     For Pharmacy Admin Tracking Only    Intervention Detail:   Total # of Interventions Recommended: 0  Total # of Interventions Accepted: 0  Time Spent (min): 15

## 2025-03-17 ENCOUNTER — APPOINTMENT (OUTPATIENT)
Age: 65
End: 2025-03-17
Payer: COMMERCIAL

## 2025-03-17 ENCOUNTER — TELEPHONE (OUTPATIENT)
Age: 65
End: 2025-03-17

## 2025-03-17 DIAGNOSIS — I26.99 OTHER PULMONARY EMBOLISM WITHOUT ACUTE COR PULMONALE, UNSPECIFIED CHRONICITY (HCC): Primary | ICD-10-CM

## 2025-03-17 DIAGNOSIS — I82.401 DEEP VEIN THROMBOSIS (DVT) OF RIGHT LOWER EXTREMITY, UNSPECIFIED CHRONICITY, UNSPECIFIED VEIN: ICD-10-CM

## 2025-03-17 RX ORDER — LOSARTAN POTASSIUM AND HYDROCHLOROTHIAZIDE 25; 100 MG/1; MG/1
1 TABLET ORAL DAILY
COMMUNITY
Start: 2025-03-11

## 2025-03-17 NOTE — TELEPHONE ENCOUNTER
Patient LVM to let us know his dose of Losartan was increased from 50mg to 100mg. Medication list updated.

## 2025-03-27 ENCOUNTER — ANTI-COAG VISIT (OUTPATIENT)
Age: 65
End: 2025-03-27
Payer: COMMERCIAL

## 2025-03-27 DIAGNOSIS — I26.99 OTHER PULMONARY EMBOLISM WITHOUT ACUTE COR PULMONALE, UNSPECIFIED CHRONICITY (HCC): Primary | ICD-10-CM

## 2025-03-27 DIAGNOSIS — I82.401 DEEP VEIN THROMBOSIS (DVT) OF RIGHT LOWER EXTREMITY, UNSPECIFIED CHRONICITY, UNSPECIFIED VEIN: ICD-10-CM

## 2025-03-27 LAB
INTERNATIONAL NORMALIZATION RATIO, POC: 1.8
PROTHROMBIN TIME, POC: 21

## 2025-03-27 PROCEDURE — 99212 OFFICE O/P EST SF 10 MIN: CPT

## 2025-03-27 PROCEDURE — 85610 PROTHROMBIN TIME: CPT

## 2025-03-27 NOTE — PROGRESS NOTES
Patient seen in clinic for warfarin management due to DVT and PE with an INR goal of 2.0-3.0.  Estimated duration of therapy is indefinite.     Patient states compliant all of the time with regimen.  No bleeding or thromboembolic side effects noted.  No significant recent illness or disease state changes.  Patient was switched from losartan to losartan-HCTZ a couple weeks ago. Sates he has been incorporating more salads into his diet.     PT/INR done in office per protocol.  INR is 1.8 which is just below goal likely due to HCTZ and increased dietary vitamin K.     Warfarin regimen will be increased to 15mg Sun/Tues/Thurs and 10mg all other days.  Will retest in 2 weeks.    Patient understands dosing directions and information discussed. Dosing schedule and follow up appointment given to patient.   Progress note routed to referring physicians office. Discussed with patient the Pharmacist Collaborative Practice Agreement.  Patient provided verbal and/or electronic (ex. [email or web address]) consent to participate in the collaborative practice agreement between the pharmacist and referred patient.     For Pharmacy Admin Tracking Only    Intervention Detail: Dose Adjustment: 1, reason: Therapy Optimization  Total # of Interventions Recommended: 1  Total # of Interventions Accepted: 1  Time Spent (min): 15

## 2025-04-10 ENCOUNTER — ANTI-COAG VISIT (OUTPATIENT)
Age: 65
End: 2025-04-10
Payer: COMMERCIAL

## 2025-04-10 DIAGNOSIS — I26.99 OTHER PULMONARY EMBOLISM WITHOUT ACUTE COR PULMONALE, UNSPECIFIED CHRONICITY (HCC): Primary | ICD-10-CM

## 2025-04-10 DIAGNOSIS — I82.401 DEEP VEIN THROMBOSIS (DVT) OF RIGHT LOWER EXTREMITY, UNSPECIFIED CHRONICITY, UNSPECIFIED VEIN: ICD-10-CM

## 2025-04-10 LAB
INR BLD: 2.6
PROTIME: 30.6

## 2025-04-10 PROCEDURE — 99212 OFFICE O/P EST SF 10 MIN: CPT

## 2025-04-10 PROCEDURE — 85610 PROTHROMBIN TIME: CPT

## 2025-04-10 NOTE — PROGRESS NOTES
Patient seen in clinic for warfarin management due to DVT and PE with an INR goal of 2.0-3.0.  Estimated duration of therapy is indefinite.     Patient states compliant all of the time with regimen.  No bleeding or thromboembolic side effects noted.  No significant med or dietary changes.  No significant recent illness or disease state changes.      PT/INR done in office per protocol.  INR is 2.6 which is therapeutic.     Warfarin regimen will be increased maintenance regimen to 15 mg Sunday, Tuesday, and Thursday; 10 mg all other days.  Will retest in 3 weeks.    Patient understands dosing directions and information discussed. Dosing schedule and follow up appointment given to patient.   Progress note routed to referring physicians office. Discussed with patient the Pharmacist Collaborative Practice Agreement.  Patient provided verbal and/or electronic (ex. [employer]) consent to participate in the collaborative practice agreement between the pharmacist and referred patient.     For Pharmacy Admin Tracking Only    Intervention Detail: Dose Adjustment: 1, reason: Therapy Optimization  Total # of Interventions Recommended: 1  Total # of Interventions Accepted: 1  Time Spent (min): 15

## 2025-05-01 ENCOUNTER — ANTI-COAG VISIT (OUTPATIENT)
Age: 65
End: 2025-05-01
Payer: COMMERCIAL

## 2025-05-01 DIAGNOSIS — I82.401 DEEP VEIN THROMBOSIS (DVT) OF RIGHT LOWER EXTREMITY, UNSPECIFIED CHRONICITY, UNSPECIFIED VEIN (HCC): ICD-10-CM

## 2025-05-01 DIAGNOSIS — I26.99 OTHER PULMONARY EMBOLISM WITHOUT ACUTE COR PULMONALE, UNSPECIFIED CHRONICITY (HCC): Primary | ICD-10-CM

## 2025-05-01 LAB
INTERNATIONAL NORMALIZATION RATIO, POC: 2
PROTHROMBIN TIME, POC: 24

## 2025-05-01 PROCEDURE — 85610 PROTHROMBIN TIME: CPT | Performed by: PHARMACIST

## 2025-05-01 PROCEDURE — 99212 OFFICE O/P EST SF 10 MIN: CPT | Performed by: PHARMACIST

## 2025-05-01 NOTE — PROGRESS NOTES
Patient seen in clinic for warfarin management due to DVT and PE with an INR goal of 2.0-3.0.  Estimated duration of therapy is indefinite.     Patient states compliant all of the time with regimen.  No bleeding or thromboembolic side effects noted.  No significant med or dietary changes.  No significant recent illness or disease state changes.      PT/INR done in office per protocol.  INR is 2.0 which is therapeutic.     Warfarin regimen will be increased to 10mg Mon/Wed/Fri and 15mg all other days in effort to keep patient more mid-range given recent thrombotic event.  Will retest in 3 weeks.    Patient understands dosing directions and information discussed. Dosing schedule and follow up appointment given to patient.   Progress note routed to referring physicians office. Discussed with patient the Pharmacist Collaborative Practice Agreement.  Patient provided verbal and/or electronic (ex. [employer]) consent to participate in the collaborative practice agreement between the pharmacist and referred patient.     For Pharmacy Admin Tracking Only    Intervention Detail: Dose Adjustment: 1, reason: Therapy Optimization  Total # of Interventions Recommended: 1  Total # of Interventions Accepted: 1  Time Spent (min): 15

## 2025-05-21 ENCOUNTER — ANTI-COAG VISIT (OUTPATIENT)
Age: 65
End: 2025-05-21
Payer: COMMERCIAL

## 2025-05-21 DIAGNOSIS — I82.401 DEEP VEIN THROMBOSIS (DVT) OF RIGHT LOWER EXTREMITY, UNSPECIFIED CHRONICITY, UNSPECIFIED VEIN (HCC): ICD-10-CM

## 2025-05-21 DIAGNOSIS — I26.99 OTHER PULMONARY EMBOLISM WITHOUT ACUTE COR PULMONALE, UNSPECIFIED CHRONICITY (HCC): Primary | ICD-10-CM

## 2025-05-21 LAB
INTERNATIONAL NORMALIZATION RATIO, POC: 2.4
PROTHROMBIN TIME, POC: 29

## 2025-05-21 PROCEDURE — 85610 PROTHROMBIN TIME: CPT

## 2025-05-21 PROCEDURE — 99211 OFF/OP EST MAY X REQ PHY/QHP: CPT

## 2025-05-21 NOTE — PROGRESS NOTES
Patient seen in clinic for warfarin management due to DVT and PE with an INR goal of 2.0-3.0.  Estimated duration of therapy is indefinite.     Patient states compliant all of the time with regimen.  No bleeding or thromboembolic side effects noted.  No significant med or dietary changes.  No significant recent illness or disease state changes.      PT/INR done in office per protocol.  INR is 2.4 which is therapeutic.     Warfarin regimen will be continued at current dose of 10mg Mon/Weds/Fri and 15mg all other days.  Will retest in 3 weeks.    Patient understands dosing directions and information discussed. Dosing schedule and follow up appointment given to patient.   Progress note routed to referring physicians office. Discussed with patient the Pharmacist Collaborative Practice Agreement.  Patient provided verbal and/or electronic (ex. [employer]) consent to participate in the collaborative practice agreement between the pharmacist and referred patient.     For Pharmacy Admin Tracking Only    Intervention Detail:   Total # of Interventions Recommended: 0  Total # of Interventions Accepted: 0  Time Spent (min): 15

## 2025-06-11 ENCOUNTER — ANTI-COAG VISIT (OUTPATIENT)
Age: 65
End: 2025-06-11
Payer: COMMERCIAL

## 2025-06-11 DIAGNOSIS — I82.401 DEEP VEIN THROMBOSIS (DVT) OF RIGHT LOWER EXTREMITY, UNSPECIFIED CHRONICITY, UNSPECIFIED VEIN (HCC): ICD-10-CM

## 2025-06-11 DIAGNOSIS — I26.99 OTHER PULMONARY EMBOLISM WITHOUT ACUTE COR PULMONALE, UNSPECIFIED CHRONICITY (HCC): Primary | ICD-10-CM

## 2025-06-11 LAB
INTERNATIONAL NORMALIZATION RATIO, POC: 2.3
PROTHROMBIN TIME, POC: 0

## 2025-06-11 PROCEDURE — 99211 OFF/OP EST MAY X REQ PHY/QHP: CPT

## 2025-06-11 PROCEDURE — 85610 PROTHROMBIN TIME: CPT

## 2025-06-11 NOTE — PROGRESS NOTES
Patient seen in clinic for warfarin management due to DVT and PE with an INR goal of 2.0-3.0.  Estimated duration of therapy is indefinite.     Patient states compliant all of the time with regimen.  No bleeding or thromboembolic side effects noted.  No significant med or dietary changes.  No significant recent illness or disease state changes.      PT/INR done in office per protocol.  INR is 2.3 which is therapeutic.     Warfarin regimen will be continued at current dose of 10mg Mon/Weds/Fri and 15mg all other days.  Will retest in 4 weeks.    Patient understands dosing directions and information discussed. Dosing schedule and follow up appointment given to patient.   Progress note routed to referring physicians office. Discussed with patient the Pharmacist Collaborative Practice Agreement.  Patient provided verbal and/or electronic (ex. [employer]) consent to participate in the collaborative practice agreement between the pharmacist and referred patient.     For Pharmacy Admin Tracking Only    Intervention Detail:   Total # of Interventions Recommended: 0  Total # of Interventions Accepted: 0  Time Spent (min): 15

## 2025-07-09 ENCOUNTER — ANTI-COAG VISIT (OUTPATIENT)
Age: 65
End: 2025-07-09
Payer: COMMERCIAL

## 2025-07-09 DIAGNOSIS — I82.401 DEEP VEIN THROMBOSIS (DVT) OF RIGHT LOWER EXTREMITY, UNSPECIFIED CHRONICITY, UNSPECIFIED VEIN (HCC): ICD-10-CM

## 2025-07-09 DIAGNOSIS — I26.99 OTHER PULMONARY EMBOLISM WITHOUT ACUTE COR PULMONALE, UNSPECIFIED CHRONICITY (HCC): Primary | ICD-10-CM

## 2025-07-09 LAB
INTERNATIONAL NORMALIZATION RATIO, POC: 3
PROTHROMBIN TIME, POC: 0

## 2025-07-09 PROCEDURE — 85610 PROTHROMBIN TIME: CPT

## 2025-07-09 PROCEDURE — 99211 OFF/OP EST MAY X REQ PHY/QHP: CPT

## 2025-07-09 NOTE — PROGRESS NOTES
Patient seen in clinic for warfarin management due to DVT and PE with an INR goal of 2.0-3.0.  Estimated duration of therapy is indefinite.     Patient states compliant all of the time with regimen.  No bleeding or thromboembolic side effects noted.  No significant med changes.  No significant recent illness or disease state changes.  States he has been drinking green tea the last week due to the heat. Counseled that this can increase INR and to drink in moderation.     PT/INR done in office per protocol.  INR is 3.0 which is therapeutic.     Warfarin regimen will be continued at current dose of 10mg every Mon/Weds/Fri and 15mg all other days.  Will retest in 4 weeks.    Patient understands dosing directions and information discussed. Dosing schedule and follow up appointment given to patient.   Progress note routed to referring physicians office. Discussed with patient the Pharmacist Collaborative Practice Agreement.  Patient provided verbal and/or electronic (ex. [employer]) consent to participate in the collaborative practice agreement between the pharmacist and referred patient.     For Pharmacy Admin Tracking Only    Intervention Detail:   Total # of Interventions Recommended: 0  Total # of Interventions Accepted: 0  Time Spent (min): 15

## 2025-08-06 ENCOUNTER — APPOINTMENT (OUTPATIENT)
Age: 65
End: 2025-08-06
Payer: COMMERCIAL

## 2025-08-11 ENCOUNTER — ANTI-COAG VISIT (OUTPATIENT)
Age: 65
End: 2025-08-11
Payer: COMMERCIAL

## 2025-08-11 DIAGNOSIS — I26.99 OTHER PULMONARY EMBOLISM WITHOUT ACUTE COR PULMONALE, UNSPECIFIED CHRONICITY (HCC): Primary | ICD-10-CM

## 2025-08-11 DIAGNOSIS — I82.401 DEEP VEIN THROMBOSIS (DVT) OF RIGHT LOWER EXTREMITY, UNSPECIFIED CHRONICITY, UNSPECIFIED VEIN (HCC): ICD-10-CM

## 2025-08-11 LAB
INTERNATIONAL NORMALIZATION RATIO, POC: 2
PROTHROMBIN TIME, POC: NORMAL

## 2025-08-11 PROCEDURE — 99211 OFF/OP EST MAY X REQ PHY/QHP: CPT | Performed by: PHARMACIST

## 2025-08-11 PROCEDURE — 85610 PROTHROMBIN TIME: CPT | Performed by: PHARMACIST
